# Patient Record
Sex: MALE | Race: BLACK OR AFRICAN AMERICAN | Employment: FULL TIME | URBAN - METROPOLITAN AREA
[De-identification: names, ages, dates, MRNs, and addresses within clinical notes are randomized per-mention and may not be internally consistent; named-entity substitution may affect disease eponyms.]

---

## 2017-04-21 DIAGNOSIS — K21.9 GASTROESOPHAGEAL REFLUX DISEASE, ESOPHAGITIS PRESENCE NOT SPECIFIED: Primary | ICD-10-CM

## 2017-04-21 RX ORDER — NICOTINE POLACRILEX 4 MG/1
20 GUM, CHEWING ORAL DAILY
Qty: 90 TABLET | Refills: 3 | Status: SHIPPED | OUTPATIENT
Start: 2017-04-21 | End: 2019-04-03

## 2017-04-21 NOTE — TELEPHONE ENCOUNTER
OMEPRAZOLE 20 MG CAP      Last Written Prescription Date: 01/10/12  Last Fill Quantity: 90,  # refills: 3   Last Office Visit with [facility], [facility] or [facility] prescribing provider: 10/06/16    Holly Padilla  Pharmacy Technician  [facility]

## 2017-05-18 ENCOUNTER — OFFICE VISIT (OUTPATIENT)
Dept: FAMILY MEDICINE | Facility: CLINIC | Age: 52
End: 2017-05-18
Payer: COMMERCIAL

## 2017-05-18 VITALS
TEMPERATURE: 97.6 F | SYSTOLIC BLOOD PRESSURE: 142 MMHG | HEIGHT: 68 IN | OXYGEN SATURATION: 96 % | WEIGHT: 174.6 LBS | DIASTOLIC BLOOD PRESSURE: 92 MMHG | HEART RATE: 84 BPM | BODY MASS INDEX: 26.46 KG/M2

## 2017-05-18 DIAGNOSIS — M50.30 DDD (DEGENERATIVE DISC DISEASE), CERVICAL: Primary | ICD-10-CM

## 2017-05-18 DIAGNOSIS — I10 ESSENTIAL HYPERTENSION WITH GOAL BLOOD PRESSURE LESS THAN 140/90: ICD-10-CM

## 2017-05-18 DIAGNOSIS — R20.2 NUMBNESS AND TINGLING OF RIGHT ARM: ICD-10-CM

## 2017-05-18 DIAGNOSIS — N52.9 ERECTILE DYSFUNCTION, UNSPECIFIED ERECTILE DYSFUNCTION TYPE: ICD-10-CM

## 2017-05-18 DIAGNOSIS — R21 RASH AND NONSPECIFIC SKIN ERUPTION: ICD-10-CM

## 2017-05-18 DIAGNOSIS — R20.0 NUMBNESS AND TINGLING OF RIGHT ARM: ICD-10-CM

## 2017-05-18 PROCEDURE — 99214 OFFICE O/P EST MOD 30 MIN: CPT | Performed by: FAMILY MEDICINE

## 2017-05-18 PROCEDURE — 80053 COMPREHEN METABOLIC PANEL: CPT | Performed by: FAMILY MEDICINE

## 2017-05-18 PROCEDURE — 36415 COLL VENOUS BLD VENIPUNCTURE: CPT | Performed by: FAMILY MEDICINE

## 2017-05-18 RX ORDER — AMLODIPINE BESYLATE 5 MG/1
10 TABLET ORAL DAILY
Qty: 90 TABLET | Refills: 3 | Status: SHIPPED | OUTPATIENT
Start: 2017-05-18 | End: 2019-04-03

## 2017-05-18 RX ORDER — LISINOPRIL 30 MG/1
30 TABLET ORAL DAILY
Qty: 90 TABLET | Refills: 3 | Status: SHIPPED | OUTPATIENT
Start: 2017-05-18 | End: 2017-08-21

## 2017-05-18 RX ORDER — SILDENAFIL 100 MG/1
50-100 TABLET, FILM COATED ORAL DAILY PRN
Qty: 6 TABLET | Refills: 1 | Status: SHIPPED | OUTPATIENT
Start: 2017-05-18 | End: 2017-06-07

## 2017-05-18 NOTE — LETTER
[address] [address] [phone]        May 22, 2017      Baron HUSSEIN Kenney  [address] [facility] [address]          Dear Homa Arellanom,    The results of your recent lab tests were within normal limits. Enclosed is a copy of these results.  If you have any further questions or problems, please contact our office.    Sincerely,        Cj Villanueva MD        Results for orders placed or performed in visit on 05/18/17   Comprehensive metabolic panel   Result Value Ref Range    Sodium 142 133 - 144 mmol/L    Potassium 4.6 3.4 - 5.3 mmol/L    Chloride 108 94 - 109 mmol/L    Carbon Dioxide 25 20 - 32 mmol/L    Anion Gap 9 3 - 14 mmol/L    Glucose 90 70 - 99 mg/dL    Urea Nitrogen 17 7 - 30 mg/dL    Creatinine 1.21 0.66 - 1.25 mg/dL    GFR Estimate 63 >60 mL/min/1.7m2    GFR Estimate If Black 77 >60 mL/min/1.7m2    Calcium 9.3 8.5 - 10.1 mg/dL    Bilirubin Total 0.2 0.2 - 1.3 mg/dL    Albumin 4.1 3.4 - 5.0 g/dL    Protein Total 7.7 6.8 - 8.8 g/dL    Alkaline Phosphatase 61 40 - 150 U/L    ALT 29 0 - 70 U/L    AST 17 0 - 45 U/L

## 2017-05-18 NOTE — PROGRESS NOTES
"  SUBJECTIVE:     CC: Baron HUSSEIN Kenney is an 51 year old male who presents for preventative health visit.     Healthy Habits:    Do you get at least three servings of calcium containing foods daily (dairy, green leafy vegetables, etc.)? {YES/NO, DAIRY INTAKE:173626::\"yes\"}    Amount of exercise or daily activities, outside of work: {AMOUNT EXERCISE:154332}    Problems taking medications regularly {Yes /No default:317216::\"No\"}    Medication side effects: {Yes /No default.:631811::\"No\"}    Have you had an eye exam in the past two years? {YESNOBLANK:137208}    Do you see a dentist twice per year? {YESNOBLANK:360427}    Do you have sleep apnea, excessive snoring or daytime drowsiness?{YESNOBLANK:563628}    {Outside tests to abstract? :282935}    {additional problems to add:240886}    Today's PHQ-2 Score:   PHQ-2 ( 1999 [employer]) 10/6/2016 6/16/2016   Q1: Little interest or pleasure in doing things 0 0   Q2: Feeling down, depressed or hopeless 0 0   PHQ-2 Score 0 0     {PHQ-2 LOOK IN ASSESSMENTS :484064}  Abuse: Current or Past(Physical, Sexual or Emotional)- {YES/NO/NA:996044}  Do you feel safe in your environment - {YES/NO/NA:289532}    Social History   Substance Use Topics     Smoking status: Never Smoker     Smokeless tobacco: Never Used     Alcohol use Yes      Comment: 1-2/wk     {ETOH AUDIT:408046}    Last PSA: No results found for: PSA    Recent Labs   Lab Test  10/06/16   0920  12/18/15   0845  01/10/13   0641   CHOL  212*  202*  248*   HDL  61  63  71   LDL  133*  115*  161*   TRIG  92  119  79   CHOLHDLRATIO   --    --   3.5   NHDL  151*  139*   --        Reviewed orders with patient. Reviewed health maintenance and updated orders accordingly - {Yes/No:700817::\"Yes\"}    Reviewed and updated as needed this visit by clinical staff         Reviewed and updated as needed this visit by Provider        {HISTORY OPTIONS:884741}    ROS:  {MALE ROS-adult preventive care package:900483::\"C: NEGATIVE for fever, chills, " "change in weight\",\"I: NEGATIVE for worrisome rashes, moles or lesions\",\"E: NEGATIVE for vision changes or irritation\",\"ENT: NEGATIVE for ear, mouth and throat problems\",\"R: NEGATIVE for significant cough or SOB\",\"CV: NEGATIVE for chest pain, palpitations or peripheral edema\",\"GI: NEGATIVE for nausea, abdominal pain, heartburn, or change in bowel habits\",\" male: negative for dysuria, hematuria, decreased urinary stream, erectile dysfunction, urethral discharge\",\"M: NEGATIVE for significant arthralgias or myalgia\",\"N: NEGATIVE for weakness, dizziness or paresthesias\",\"P: NEGATIVE for changes in mood or affect\"}    {CHRONICPROBDATA:892342}  OBJECTIVE:     There were no vitals taken for this visit.  EXAM:  {Exam Choices:658967}    ASSESSMENT/PLAN:     {Diag Picklist:888137}    COUNSELING:  {MALE COUNSELING MESSAGES:236162::\"Reviewed preventive health counseling, as reflected in patient instructions\"}    {Blood Pressure - Adult Preventive:169210}     reports that he has never smoked. He has never used smokeless tobacco.  {Tobacco Cessation needed for ACO -- Delete if patient is a non-smoker:656916}  Estimated body mass index is 25.95 kg/(m^2) as calculated from the following:    Height as of 10/6/16: 5' 8.11\" (1.73 m).    Weight as of 10/6/16: 171 lb 3.2 oz (77.7 kg).   {Weight Management Plan needed for ACO:767413}    Counseling Resources:  ATP IV Guidelines  Pooled Cohorts Equation Calculator  FRAX Risk Assessment  ICSI Preventive Guidelines  Dietary Guidelines for Americans, 2010  USDA's MyPlate  ASA Prophylaxis  Lung CA Screening    Cj Villanueva MD  [facility] [place]  "

## 2017-05-18 NOTE — PROGRESS NOTES
"  SUBJECTIVE:                                                    Baron HUSSEIN Kenney is a 51 year old male who presents to clinic today for the following health issues:      Joint Pain     Onset: off and on 2 years but 2 weeks it was worsen     Description:   Location: right shoulder  Character: Dull ache    Intensity: moderate    Progression of Symptoms: worse    Accompanying Signs & Symptoms:  Other symptoms: numbness, tingling and swelling   History:   Previous similar pain: no       Precipitating factors:   Trauma or overuse: no     Alleviating factors:  Improved by: nothing       Therapies Tried and outcome: none       Hypertension Follow-up      Outpatient blood pressures are not being checked.    Low Salt Diet: no added salt     Encounter Diagnoses   Name Primary?     Essential hypertension with goal blood pressure less than 140/90      DDD (degenerative disc disease), cervical Yes     Numbness and tingling of right arm      Erectile dysfunction, unspecified erectile dysfunction type, would like to try medication       Rash and nonspecific skin eruption right ankle not responding to anti fungals         Problem list and histories reviewed & adjusted, as indicated.  Additional history: as documented    Labs reviewed in EPIC    Reviewed and updated as needed this visit by clinical staff       Reviewed and updated as needed this visit by Provider         ROS:  Constitutional, HEENT, cardiovascular, pulmonary, gi and gu systems are negative, except as otherwise noted.    OBJECTIVE:                                                    BP (!) 142/92  Pulse 84  Temp 97.6  F (36.4  C) (Oral)  Ht 5' 8.11\" (1.73 m)  Wt 174 lb 9.6 oz (79.2 kg)  SpO2 96%  BMI 26.46 kg/m2  Body mass index is 26.46 kg/(m^2).  GENERAL: alert and fit  EYES: Eyes grossly normal to inspection, PERRL and conjunctivae and sclerae normal  HENT: ear canals and TM's normal, nose and mouth without ulcers or lesions  NECK: no adenopathy, no " asymmetry, masses, or scars and thyroid normal to palpation  RESP: lungs clear to auscultation - no rales, rhonchi or wheezes  CV: regular rate and rhythm, normal S1 S2, no S3 or S4, no murmur, click or rub, no peripheral edema and peripheral pulses strong  ABDOMEN: soft, nontender, no hepatosplenomegaly, no masses and bowel sounds normal  MS: normal muscle tone but strenght decreased in right arm vs left  and tenderness to palpation right cervical spne   SKIN: maculopapular eruption - right ankle  NEURO: weakness of right arm, sensory exam grossly normal, light touch and pinprick normal and mentation intact    Diagnostic Test Results:  Results for orders placed or performed in visit on 10/06/16   Comprehensive metabolic panel   Result Value Ref Range    Sodium 139 133 - 144 mmol/L    Potassium 3.6 3.4 - 5.3 mmol/L    Chloride 106 94 - 109 mmol/L    Carbon Dioxide 27 20 - 32 mmol/L    Anion Gap 6 3 - 14 mmol/L    Glucose 94 70 - 99 mg/dL    Urea Nitrogen 13 7 - 30 mg/dL    Creatinine 1.20 0.66 - 1.25 mg/dL    GFR Estimate 64 >60 mL/min/1.7m2    GFR Estimate If Black 78 >60 mL/min/1.7m2    Calcium 9.3 8.5 - 10.1 mg/dL    Bilirubin Total 0.5 0.2 - 1.3 mg/dL    Albumin 4.0 3.4 - 5.0 g/dL    Protein Total 7.5 6.8 - 8.8 g/dL    Alkaline Phosphatase 54 40 - 150 U/L    ALT 41 0 - 70 U/L    AST 17 0 - 45 U/L   Microalbumin quantitative random urine   Result Value Ref Range    Creatinine Urine 195 mg/dL    Albumin Urine mg/L 7 mg/L    Albumin Urine mg/g Cr 3.83 0 - 17 mg/g Cr   TSH with free T4 reflex   Result Value Ref Range    TSH 0.77 0.40 - 4.00 mU/L   Lipid Profile   Result Value Ref Range    Cholesterol 212 (H) <200 mg/dL    Triglycerides 92 <150 mg/dL    HDL Cholesterol 61 >39 mg/dL    LDL Cholesterol Calculated 133 (H) <100 mg/dL    Non HDL Cholesterol 151 (H) <130 mg/dL        ASSESSMENT/PLAN:                                                            1. Essential hypertension with goal blood pressure less than  140/90  Not Well controlled   increase  - amLODIPine (NORVASC) 5 MG tablet; Take 2 tablets (10 mg) by mouth daily  Dispense: 90 tablet; Refill: 3  - lisinopril (PRINIVIL,ZESTRIL) 30 MG tablet; Take 1 tablet (30 mg) by mouth daily  Dispense: 90 tablet; Refill: 3  - Comprehensive metabolic panel   Follow up in 2 months.   2. DDD (degenerative disc disease), cervical  Needs evaluation due to radicuopthy  - MR Cervical Spine w/o & w Contrast; Future    3. Numbness and tingling of right arm  As above  - MR Cervical Spine w/o & w Contrast; Future    4. Erectile dysfunction, unspecified erectile dysfunction type  Ok to try once BP controlled  - sildenafil (VIAGRA) 100 MG cap/tab; Take 0.5-1 tablets ( mg) by mouth daily as needed for erectile dysfunction Take 30 min to 4 hours before intercourse.  Never use with nitroglycerin, terazosin or doxazosin.  Dispense: 6 tablet; Refill: 1    5. Rash and nonspecific skin eruption  Did noot respond to antifungals/ steroid  - DERMATOLOGY REFERRAL    Regular exercise  See Patient Instructions    Cj Villanueva MD  [facility] [place]

## 2017-05-18 NOTE — MR AVS SNAPSHOT
After Visit Summary   5/18/2017    Baron HUSSEIN Kenney    MRN: [id number]           Patient Information     Date Of Birth          1965        Visit Information        Provider Department      5/18/2017 11:15 AM Cj Villanueva MD [facility] [place]        Today's Diagnoses     DDD (degenerative disc disease), cervical    -  1    Essential hypertension with goal blood pressure less than 140/90        Numbness and tingling of right arm        Erectile dysfunction, unspecified erectile dysfunction type          Care Instructions      Preventive Health Recommendations  Male Ages 50 - 64    Yearly exam:             See your health care provider every year in order to  o   Review health changes.   o   Discuss preventive care.    o   Review your medicines if your doctor has prescribed any.     Have a cholesterol test every 5 years, or more frequently if you are at risk for high cholesterol/heart disease.     Have a diabetes test (fasting glucose) every three years. If you are at risk for diabetes, you should have this test more often.     Have a colonoscopy at age 50, or have a yearly FIT test (stool test). These exams will check for colon cancer.      Talk with your health care provider about whether or not a prostate cancer screening test (PSA) is right for you.    You should be tested each year for STDs (sexually transmitted diseases), if you re at risk.     Shots: Get a flu shot each year. Get a tetanus shot every 10 years.     Nutrition:    Eat at least 5 servings of fruits and vegetables daily.     Eat whole-grain bread, whole-wheat pasta and brown rice instead of white grains and rice.     Talk to your provider about Calcium and Vitamin D.     Lifestyle    Exercise for at least 150 minutes a week (30 minutes a day, 5 days a week). This will help you control your weight and prevent disease.     Limit alcohol to one drink per day.     No smoking.     Wear sunscreen to prevent skin  "cancer.     See your dentist every six months for an exam and cleaning.     See your eye doctor every 1 to 2 years.          Follow-ups after your visit        Future tests that were ordered for you today     Open Future Orders        Priority Expected Expires Ordered    MR Cervical Spine w/o & w Contrast Routine  2018            Who to contact     If you have questions or need follow up information about today's clinic visit or your schedule please contact [facility] directly at [phone].  Normal or non-critical lab and imaging results will be communicated to you by [employer], letter or phone within 4 business days after the clinic has received the results. If you do not hear from us within 7 days, please contact the clinic through [employer] or phone. If you have a critical or abnormal lab result, we will notify you by phone as soon as possible.  Submit refill requests through [employer] or call your pharmacy and they will forward the refill request to us. Please allow 3 business days for your refill to be completed.          Additional Information About Your Visit        [employer] Information     [employer] lets you send messages to your doctor, view your test results, renew your prescriptions, schedule appointments and more. To sign up, go to [email or web address] . Click on \"Log in\" on the left side of the screen, which will take you to the Welcome page. Then click on \"Sign up Now\" on the right side of the page.     You will be asked to enter the access code listed below, as well as some personal information. Please follow the directions to create your username and password.     Your access code is: [email or web address]  Expires: 2017  9:30 AM     Your access code will  in 90 days. If you need help or a new code, please call your [facility] clinic or [phone].        Care EveryWhere ID     This is your Care EveryWhere ID. This could be used by other organizations to access your " "[facility] medical records  [id number]        Your Vitals Were     Pulse Temperature Height Pulse Oximetry BMI (Body Mass Index)       84 97.6  F (36.4  C) (Oral) 5' 8.11\" (1.73 m) 96% 26.46 kg/m2        Blood Pressure from Last 3 Encounters:   05/18/17 (!) 142/92   10/06/16 137/85   08/17/16 126/72    Weight from Last 3 Encounters:   05/18/17 174 lb 9.6 oz (79.2 kg)   10/06/16 171 lb 3.2 oz (77.7 kg)   08/17/16 175 lb (79.4 kg)              We Performed the Following     Comprehensive metabolic panel          Today's Medication Changes          These changes are accurate as of: 5/18/17 11:47 AM.  If you have any questions, ask your nurse or doctor.               Start taking these medicines.        Dose/Directions    sildenafil 100 MG cap/tab   Commonly known as:  VIAGRA   Used for:  Erectile dysfunction, unspecified erectile dysfunction type   Started by:  Cj Villanueva MD        Dose:   mg   Take 0.5-1 tablets ( mg) by mouth daily as needed for erectile dysfunction Take 30 min to 4 hours before intercourse.  Never use with nitroglycerin, terazosin or doxazosin.   Quantity:  6 tablet   Refills:  1         These medicines have changed or have updated prescriptions.        Dose/Directions    amLODIPine 5 MG tablet   Commonly known as:  NORVASC   This may have changed:  how much to take   Used for:  Essential hypertension with goal blood pressure less than 140/90   Changed by:  Cj Villanueva MD        Dose:  10 mg   Take 2 tablets (10 mg) by mouth daily   Quantity:  90 tablet   Refills:  3            Where to get your medicines      These medications were sent to [facility] [place], [place] - [address]  [address] [address]     Phone:  [phone]     amLODIPine 5 MG tablet    lisinopril 30 MG tablet         Some of these will need a paper prescription and others can be bought over the counter.  Ask your nurse if you have questions.  "    Bring a paper prescription for each of these medications     sildenafil 100 MG cap/tab                Primary Care Provider Office Phone # Fax #    Cj Luís Villanueva [phone]       [facility] [address] [facility] [address]  [facility] [address]        Thank you!     Thank you for choosing [facility]  for your care. Our goal is always to provide you with excellent care. Hearing back from our patients is one way we can continue to improve our services. Please take a few minutes to complete the written survey that you may receive in the mail after your visit with us. Thank you!             Your Updated Medication List - Protect others around you: Learn how to safely use, store and throw away your medicines at www.disposemymeds.org.          This list is accurate as of: 5/18/17 11:47 AM.  Always use your most recent med list.                   Brand Name Dispense Instructions for use    amLODIPine 5 MG tablet    NORVASC    90 tablet    Take 2 tablets (10 mg) by mouth daily       aspirin 81 MG tablet     90 tablet    Take 1 tablet (81 mg) by mouth daily       lisinopril 30 MG tablet    PRINIVIL,ZESTRIL    90 tablet    Take 1 tablet (30 mg) by mouth daily       omeprazole 20 MG tablet     90 tablet    Take 1 tablet (20 mg) by mouth daily       sildenafil 100 MG cap/tab    VIAGRA    6 tablet    Take 0.5-1 tablets ( mg) by mouth daily as needed for erectile dysfunction Take 30 min to 4 hours before intercourse.  Never use with nitroglycerin, terazosin or doxazosin.       triamcinolone 0.1 % ointment    KENALOG    15 g    Apply sparingly to affected area three times daily for 14 days.

## 2017-05-18 NOTE — PROGRESS NOTES
"Chief Complaint   Patient presents with     Musculoskeletal Problem       Initial BP (!) 148/104  Pulse 84  Temp 97.6  F (36.4  C) (Oral)  Ht 5' 8.11\" (1.73 m)  Wt 174 lb 9.6 oz (79.2 kg)  SpO2 96%  BMI 26.46 kg/m2 Estimated body mass index is 26.46 kg/(m^2) as calculated from the following:    Height as of this encounter: 5' 8.11\" (1.73 m).    Weight as of this encounter: 174 lb 9.6 oz (79.2 kg).  Medication Reconciliation: complete     Kat Figueroa MA      "

## 2017-05-19 LAB
ALBUMIN SERPL-MCNC: 4.1 G/DL (ref 3.4–5)
ALP SERPL-CCNC: 61 U/L (ref 40–150)
ALT SERPL W P-5'-P-CCNC: 29 U/L (ref 0–70)
ANION GAP SERPL CALCULATED.3IONS-SCNC: 9 MMOL/L (ref 3–14)
AST SERPL W P-5'-P-CCNC: 17 U/L (ref 0–45)
BILIRUB SERPL-MCNC: 0.2 MG/DL (ref 0.2–1.3)
BUN SERPL-MCNC: 17 MG/DL (ref 7–30)
CALCIUM SERPL-MCNC: 9.3 MG/DL (ref 8.5–10.1)
CHLORIDE SERPL-SCNC: 108 MMOL/L (ref 94–109)
CO2 SERPL-SCNC: 25 MMOL/L (ref 20–32)
CREAT SERPL-MCNC: 1.21 MG/DL (ref 0.66–1.25)
GFR SERPL CREATININE-BSD FRML MDRD: 63 ML/MIN/1.7M2
GLUCOSE SERPL-MCNC: 90 MG/DL (ref 70–99)
POTASSIUM SERPL-SCNC: 4.6 MMOL/L (ref 3.4–5.3)
PROT SERPL-MCNC: 7.7 G/DL (ref 6.8–8.8)
SODIUM SERPL-SCNC: 142 MMOL/L (ref 133–144)

## 2017-05-19 NOTE — PROGRESS NOTES
"Please notify patient of normal lab results.  Thank you.    Please add below note.  \"The results of your recent lab tests were within normal limits. Enclosed is a copy of these results.  If you have any further questions or problems, please contact our office at [phone].\""

## 2017-05-24 ENCOUNTER — TELEPHONE (OUTPATIENT)
Dept: FAMILY MEDICINE | Facility: CLINIC | Age: 52
End: 2017-05-24

## 2017-05-24 ENCOUNTER — HOSPITAL ENCOUNTER (OUTPATIENT)
Dept: MRI IMAGING | Facility: CLINIC | Age: 52
Discharge: HOME | End: 2017-05-24
Attending: FAMILY MEDICINE | Admitting: FAMILY MEDICINE
Payer: COMMERCIAL

## 2017-05-24 DIAGNOSIS — R20.0 NUMBNESS AND TINGLING OF RIGHT ARM: ICD-10-CM

## 2017-05-24 DIAGNOSIS — M50.30 DDD (DEGENERATIVE DISC DISEASE), CERVICAL: ICD-10-CM

## 2017-05-24 DIAGNOSIS — R20.2 NUMBNESS AND TINGLING OF RIGHT ARM: ICD-10-CM

## 2017-05-24 DIAGNOSIS — M48.02 SPINAL STENOSIS IN CERVICAL REGION: Primary | ICD-10-CM

## 2017-05-24 PROCEDURE — 72141 MRI NECK SPINE W/O DYE: CPT

## 2017-05-24 NOTE — TELEPHONE ENCOUNTER
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       No referral for spine surgeon was placed and there are a few options. Please review and sign referral, then will call pt back with referral information. He was already given results. Thanks.    Bella Chacon RN  [facility] [place]

## 2017-05-25 NOTE — TELEPHONE ENCOUNTER
Thanks     Orders Placed This Encounter     SPINE SURGERY REFERRAL     Referral Type:   Consultation

## 2017-05-25 NOTE — TELEPHONE ENCOUNTER
Called pt with referral information.    Bella Chacon RN  [facility] [place]

## 2017-06-07 ENCOUNTER — OFFICE VISIT (OUTPATIENT)
Dept: NEUROSURGERY | Facility: CLINIC | Age: 52
End: 2017-06-07

## 2017-06-07 VITALS
BODY MASS INDEX: 26.59 KG/M2 | HEIGHT: 67 IN | DIASTOLIC BLOOD PRESSURE: 99 MMHG | TEMPERATURE: 97.7 F | RESPIRATION RATE: 20 BRPM | WEIGHT: 169.4 LBS | HEART RATE: 78 BPM | SYSTOLIC BLOOD PRESSURE: 143 MMHG | OXYGEN SATURATION: 98 %

## 2017-06-07 DIAGNOSIS — M48.02 SPINAL STENOSIS IN CERVICAL REGION: Primary | ICD-10-CM

## 2017-06-07 DIAGNOSIS — M47.22 CERVICAL RADICULOPATHY DUE TO OSTEOARTHRITIS OF SPINE: ICD-10-CM

## 2017-06-07 RX ORDER — METHYLPREDNISOLONE 4 MG
TABLET, DOSE PACK ORAL
Qty: 21 TABLET | Refills: 0 | Status: SHIPPED | OUTPATIENT
Start: 2017-06-07 | End: 2018-01-29

## 2017-06-07 ASSESSMENT — PAIN SCALES - GENERAL: PAINLEVEL: MILD PAIN (3)

## 2017-06-07 ASSESSMENT — ENCOUNTER SYMPTOMS
PARALYSIS: 0
TINGLING: 1
NAIL CHANGES: 0
BACK PAIN: 1
MEMORY LOSS: 0
NECK PAIN: 1
JOINT SWELLING: 0
STIFFNESS: 0
SPEECH CHANGE: 0
POOR WOUND HEALING: 0
HEADACHES: 0
MYALGIAS: 0
MUSCLE CRAMPS: 0
LOSS OF CONSCIOUSNESS: 0
DISTURBANCES IN COORDINATION: 0
ARTHRALGIAS: 1
TREMORS: 0
DIZZINESS: 0
MUSCLE WEAKNESS: 1
WEAKNESS: 0
SKIN CHANGES: 0
SEIZURES: 0

## 2017-06-07 NOTE — PATIENT INSTRUCTIONS
1.  Call [facility] at [phone] to schedule your consultation and for instructions with Cervical traction  2.  If you have questions or concerns, please call [facility] at [phone]  3.  Take steroids as directed

## 2017-06-07 NOTE — NURSING NOTE
Chief Complaint   Patient presents with     Consult     [facility]- RIGHT SIDED NECK AND SHOULDER PAIN.     ELIAZAR Bourne.A

## 2017-06-07 NOTE — MR AVS SNAPSHOT
After Visit Summary   6/7/2017    Baron HUSSEIN Kenney    MRN: [id number]           Patient Information     Date Of Birth          1965        Visit Information        Provider Department      6/7/2017 12:30 PM Emely Shah PA-C [facility] Neurosurgery        Today's Diagnoses     Spinal stenosis in cervical region    -  1      Care Instructions    1.  Call [facility] at [phone] to schedule your consultation and for instructions with Cervical traction  2.  If you have questions or concerns, please call Adela at [phone]  3.  Take steroids as directed           Follow-ups after your visit        Additional Services     PT Evaluation and Treatment (Internal Referral) [2990]       Your provider has referred you to: [facility], phone [phone].    Please be aware that coverage of these services is subject to the terms and limitations of your health insurance plan.  Call member services at your health plan with any benefit or coverage questions.      Treatment: Evaluation & Treatment  Special Instructions: None  Special Programs: None  Modalities: As indicated  Equipment: None  Duration and Frequency: Per Therapist Evaluation    Please bring the following to your appointment:  >>   Any x-rays, CTs or MRIs which have been performed.  Contact the facility where they were done to arrange for  prior to your scheduled appointment.  Any new CT, MRI or other procedures ordered by your specialist must be performed at a [facility] [place] facility or coordinated by your clinic's referral office.    >>   List of current medications   >>   This referral request   >>   Any documents/labs given to you for this referral      **Note to Provider** To refer patients to therapy outside of the Location list, change the order class to External Referral in the General section.                  Follow-up notes from your care team     Return in about 6 weeks (around 7/19/2017).       Your next 10 appointments already scheduled     2017  8:00 AM CDT   (Arrive by 7:45 AM)   New Patient Visit with MD JAI aCrey [facility] ([facility])    [address] [place] [id number] [phone]            2017  4:00 PM CDT   (Arrive by 3:45 PM)   Return Visit with SAMIR Navarrete [facility] ([facility])    [address] [place] [id number] [phone]              Who to contact     Please call your clinic at [phone] to:    Ask questions about your health    Make or cancel appointments    Discuss your medicines    Learn about your test results    Speak to your doctor   If you have compliments or concerns about an experience at your clinic, or if you wish to file a complaint, please contact [facility] Patient Relations at [phone] or email us at [email or web address]         Additional Information About Your Visit        [employer] Information     [employer] is an electronic gateway that provides easy, online access to your medical records. With [employer], you can request a clinic appointment, read your test results, renew a prescription or communicate with your care team.     To sign up for [employer] visit the website at [email or web address]   You will be asked to enter the access code listed below, as well as some personal information. Please follow the directions to create your username and password.     Your access code is: [email or web address]  Expires: 2017  1:40 PM     Your access code will  in 90 days. If you need help or a new code, please contact your [facility] or call [phone] for assistance.        Care EveryWhere ID     This is your Care EveryWhere ID. This could be used by other organizations to access your [facility] medical records  [id number]        Your  "Vitals Were     Pulse Temperature Respirations Height Pulse Oximetry BMI (Body Mass Index)    78 97.7  F (36.5  C) (Oral) 20 1.702 m (5' 7\") 98% 26.53 kg/m2       Blood Pressure from Last 3 Encounters:   06/07/17 (!) 143/99   05/18/17 (!) 142/92   10/06/16 137/85    Weight from Last 3 Encounters:   06/07/17 76.8 kg (169 lb 6.4 oz)   05/18/17 79.2 kg (174 lb 9.6 oz)   10/06/16 77.7 kg (171 lb 3.2 oz)              We Performed the Following     PT Evaluation and Treatment (Internal Referral) [9032]          Today's Medication Changes          These changes are accurate as of: 6/7/17  1:40 PM.  If you have any questions, ask your nurse or doctor.               Start taking these medicines.        Dose/Directions    Cervical Traction Kit   Used for:  Spinal stenosis in cervical region   Started by:  Emely Shah PA-C        Use as directed   Quantity:  1 kit   Refills:  0       methylPREDNISolone 4 MG tablet   Commonly known as:  MEDROL DOSEPAK   Used for:  Spinal stenosis in cervical region   Started by:  Emely Shah PA-C        Follow package instructions   Quantity:  21 tablet   Refills:  0            Where to get your medicines      Some of these will need a paper prescription and others can be bought over the counter.  Ask your nurse if you have questions.     Bring a paper prescription for each of these medications     Cervical Traction Kit    methylPREDNISolone 4 MG tablet                Primary Care Provider Office Phone # Fax #    Cj Villanueva [phone]       [facility] [address] [place] [address] [address]  [facility] [address]        Thank you!     Thank you for choosing [facility]  for your care. Our goal is always to provide you with excellent care. Hearing back from our patients is one way we can continue to improve our services. Please take a few minutes to complete the written survey that you may receive in the mail after your visit with us. Thank you!      "        Your Updated Medication List - Protect others around you: Learn how to safely use, store and throw away your medicines at www.disposemymeds.org.          This list is accurate as of: 6/7/17  1:40 PM.  Always use your most recent med list.                   Brand Name Dispense Instructions for use    amLODIPine 5 MG tablet    NORVASC    90 tablet    Take 2 tablets (10 mg) by mouth daily       aspirin 81 MG tablet     90 tablet    Take 1 tablet (81 mg) by mouth daily       Cervical Traction Kit     1 kit    Use as directed       lisinopril 30 MG tablet    PRINIVIL,ZESTRIL    90 tablet    Take 1 tablet (30 mg) by mouth daily       methylPREDNISolone 4 MG tablet    MEDROL DOSEPAK    21 tablet    Follow package instructions       omeprazole 20 MG tablet     90 tablet    Take 1 tablet (20 mg) by mouth daily

## 2017-06-07 NOTE — PROGRESS NOTES
Neurosurgery Clinic Consult  Date of Visit: 6/7/2017  Referred for: Right arm pain.    Referring Provider:  Cj Villanueva MD      Dear Dr. Villanueva:  We were happy to see Baron Kenney in our Neurosurgery Clinic today for right arm pain.      As you may recall, his history begins [id number] years ago with some right arm soreness.  At that time, he thought it was from being very physically active, he is quite fit and continues multiple sports.  He didn't do much about it and it has stayed pretty stable over the years.  Over the last few weeks it is worsened precipitously such that he now has pain in the back of the right neck, down the right trapezius through the shoulder and the triceps all of the way down to about the elbow level.  From there, he starts having some tingling starting in the triceps which goes to the middle fingers, occasionally all of the fingers, but really mostly into the middle fingers of the right hand.  It is sometimes tingles and wakes him from sleep.  Trying to shake it out does not make much difference though.  Pain sensation gets worse with driving or using the arm for anything.  He feels as though his arm is weak and he cannot shoot a basketball as far as he used to anymore.  His weakness he notes in the deltoid, bicep and triceps - all 3 of the proximal arm muscles.  He does not feel weakness distally.  Bowel and bladder function are intact.  Other symptoms such as coordination, balance are all intact.      I reviewed his UC pain questionnaire today with him, and those results are below.  UC Pain -  Patient Entered Questionnaire/Answers 6/7/2017   What number best describes your pain right now:  0 = No pain  to  10 = Worst pain imaginable 4   How would you describe the pain? numbness   Which of the following worsen your pain? lying down, standing, sitting, walking, relaxation   Which of the following improve or reduce your pain?  nothing relieves the pain   What number best  describes your average pain for the past week:  0 = No pain  to  10 = Worst pain imaginable 6   What number best describes your LOWEST pain in past 24 hours:  0 = No pain  to  10 = Worst pain imaginable 1   What number best describes your WORST pain in past 24 hours:  0 = No pain  to  10 = Worst pain imaginable 6   When is your pain worst? Constant   What non-medicine treatments have you already had for your pain? none   Have you tried treating your pain with medication?  No   Are you currently taking medications for your pain? No      Treatment History:  Essentially none thus far.  He has had an MRI performed and based on that, he was sent for a neurosurgical evaluation.  He does recall that may be at 5-6 years ago he was given a Dosepak for some neck or back pain, but he has not had anything performed recently, no physical therapy.  He is a very physical man, does boxing, martial arts, plays basketball and he is still able to do all those things but with much less effectiveness in the function of his right arm.    He presents for evaluation, and treatment recommendations with the understanding that we are a surgical team.      Current Outpatient Prescriptions:      methylPREDNISolone (MEDROL DOSEPAK) 4 MG tablet, Follow package instructions, Disp: 21 tablet, Rfl: 0     Misc. Devices (CERVICAL TRACTION) KIT, Use as directed, Disp: 1 kit, Rfl: 0     amLODIPine (NORVASC) 5 MG tablet, Take 2 tablets (10 mg) by mouth daily, Disp: 90 tablet, Rfl: 3     lisinopril (PRINIVIL,ZESTRIL) 30 MG tablet, Take 1 tablet (30 mg) by mouth daily, Disp: 90 tablet, Rfl: 3     omeprazole 20 MG tablet, Take 1 tablet (20 mg) by mouth daily, Disp: 90 tablet, Rfl: 3     aspirin 81 MG tablet, Take 1 tablet (81 mg) by mouth daily, Disp: 90 tablet, Rfl: 3    No Known Allergies    Past Medical History:   Diagnosis Date     Gastro-oesophageal reflux disease      Hypertension        History reviewed. No pertinent surgical history.    Family  "History   Problem Relation Age of Onset     HEART DISEASE Other      4 cousins with stents at an early age     DIABETES Maternal Grandfather      DIABETES Paternal Grandfather      Hypertension Father        Social History     Social History     Marital status:      Spouse name: N/A     Number of children: N/A     Years of education: N/A     Occupational History     Not on file.     Social History Main Topics     Smoking status: Never Smoker     Smokeless tobacco: Never Used     Alcohol use 0.6 - 1.2 oz/week     1 - 2 Cans of beer per week      Comment: PER YEAR     Drug use: No     Sexual activity: Yes     Partners: Female     Other Topics Concern     Parent/Sibling W/ Cabg, Mi Or Angioplasty Before 65f 55m? No     Social History Narrative   Problem list and 13 point review of systems: is reviewed in Epic and is negative with the exception of those symptoms associated with HPI and PMH.      OBJECTIVE:   BP (!) 143/99 (BP Location: Right arm, Patient Position: Chair, Cuff Size: Adult Large)  Pulse 78  Temp 97.7  F (36.5  C) (Oral)  Resp 20  Ht 1.702 m (5' 7\")  Wt 76.8 kg (169 lb 6.4 oz)  SpO2 98%  BMI 26.53 kg/m2    Imaging:  These are the pertinent radiologist's findings from:  MRI Cspine WO 5/24/17 @ [facility]   C2-C3: Normal disc, facet joints, spinal canal and neural foramina.     C3-C4: Mild annular disc bulge. Central canal patent. Moderate left  foraminal stenosis due to an uncinate spur.     C4-C5: Degeneration of the disc. Mild loss of disc space height. Mild  diffuse annular disc bulge. Associated posterior osteophyte. Central  canal mild-moderately narrowed due to the bulging disc.  Moderate-severe bilateral foraminal stenosis due to loss of disc space  height and uncinate spurs.     C5-C6: Degeneration of the disc. Loss of disc space height. Diffuse  annular disc bulge with associated posterior osteophytes. Central  canal mildly narrowed. Severe right and moderate-severe left foraminal  stenosis " due to loss of disc space height and uncinate spurs.     C6-C7: Degeneration of the disc. Loss of disc space height. Diffuse  annular disc bulge with associated posterior osteophytes leading to  mild central stenosis. There is severe right and left foraminal  stenosis due to loss of disc space height and uncinate spurs.     C7-T1: Degeneration of the disc. Loss of disc space height. Mild  annular disc bulge. Central canal and neural foramen appear patent.         IMPRESSION:    1. Multilevel degenerative change.  2. There is severe bilateral foraminal stenosis at C4-C5, C5-C6 and  C6-C7 due to loss of disc space height and uncinate spurs.  3. No focal right-sided disc herniations are seen   See the full report in EPIC/Media tab.  I personally reviewed the images with the patient.      Exam:  Pertinent positives:  He has bilateral triceps weakness, no abnormal reflexes, no loss of sensation to light touch.  Given that he has an extremely powerful upper torso, I am unable to detect any other weakness.   *   Well developed, well nourished male found seated comfortably in exam room chair.  He is able to sit and rise independently.  He is unaccompanied.    *  Mental Status  A&O X3.  Bright, alert, affable, interactive. Language fluid, fund of knowledge intact. Good historian.  Mood and affect congruent and WNL.   *  Cranial Nerves  II: Able to read printed forms, VF full to gross confrontation.  III: IV, VI:  PERRLA, EOMI, No nystagmus, no ptosis.  V: Sensation intact in bilateral V1, V2, and V3. Jaw clench symmetric.    VII:  Intact to voice bilaterally.  IX:  Pushes tongue against bilateral cheeks.  X:  Palate elevates, uvula midline, phonation intact.  XI: Elevates shoulders, head turn intact.  XII: Tongue midline. No fasciculations.  *  Cerebellar:  Finger nose accurate. Heel shin intact.   Rapid alternating movements smooth.  *  Brien-cranial:    No drift.  *  Cervical Spine:  DTR's at Biceps, Triceps, and  Brachioradialis 2/4 and symmetric.  Sensation intact.    No Knowles's. No Phalen's.  No Tinel's. No fasciculations.   Muscle bulk and tone WNL.  Upper Extremity Strength.              RIGHT                LEFT     Deltoid              5/5                   5/5       Biceps              5/5                   5/5        Triceps              5/5                   5/5       Wrist Extensor              4/5                   4/5                     5/5                    5/5       Interossei              5/5                   5/5       EPL              5/5                    5/5       Pinch              5/5                  5/5          *  Lumbar Spine:    DTR's Patellar and Achilles 1/4 and symmetric.   No fasciculations.  Muscle bulk and tone WNL.  No Clonus.  Sensation intact.    Lower Extremity Strength                   RIGHT                   LEFT     Iliopsoas                    5/5                      5/5       Quad                    5/5                        5/5       Hamstring                      5/5                        5/5         Gastrocs                    5/5                        5/5       Tib. Anterior                     5/5                        5/5       EHL                      5/5                        5/5       Babinski                 Down                                      Down                   *  Structural Exam  Inspection of the spine reveals no scoliosis, exaggerated kyphosis or lordosis.  Head is balanced over the pelvis in the coronal and sagittal plane.    Cervical spine ROM full. No spasming. No tenderness to palpation.  No Spurling's or L'Hermitte's.   Lumbar ROM full.  Feet are warm.  Gait narrow, smooth, no scissoring.  Negative Romberg.     ASSESSMENT:  1. Spinal stenosis in cervical region    2. Cervical radiculopathy due to osteoarthritis of spine    3.      Mr. Kenney has multiple levels of cervical spondylosis, most remarkably left C3-4, bilateral C4-5, right  greater than left C5-6, bilateral C6-7.  He has central stenosis at multiple levels but not severe.  He has most significantly pain and tingling into the fingers in the C7 distribution.  I believe that is probably has primarily symptomatic level, although others could be contributing as well.  At this point, we are going to try to manage this nonoperatively, even given the triceps weakness noted.  We will start him Dosepak, physical therapy and home traction unit and will follow up in about 6 weeks.  If he has made no improvement and his triceps are still weak, then we will get him off to see the neurosurgeon for a surgical discussion.  He is happy with this plan.  He does not want to have surgery if he can possibly avoid it, and I agree.      I have answered all of his questions.  The questions indicated a good understanding of the discussion.  We have supplied him with business cards and telephone numbers and urged him to call should he have any questions, comments or concerns.      It has been our pleasure looking after this nice patient.  We appreciate your confidence in [facility] Department of Neurosurgery.       Best Regards,    Emely Shah PA-C  [facility]  Department of Neurosurgery  Phone: [phone]  Fax: [phone]        Total time: 60 minutes with more than 30 minutes spent in direct face to face contact reviewing films, providing education, counseling, the importance of good health habits, non-operative therapies,and indications for surgery as well as further follow up.

## 2017-06-07 NOTE — LETTER
6/7/2017       RE: Baron HUSSEIN Kenney  [address]  [facility] [address]     Dear Colleague,    Thank you for referring your patient, Baron HUSSEIN Kenney, to the [facility] NEUROSURGERY at [facility]. Please see a copy of my visit note below.      Neurosurgery Clinic Consult  Date of Visit: 6/7/2017  Referred for: Right arm pain.    Referring Provider:  Cj Villanueva MD      Dear Dr. Villanueva:  We were happy to see Baron Kenney in our Neurosurgery Clinic today for right arm pain.      As you may recall, his history begins [id number] years ago with some right arm soreness.  At that time, he thought it was from being very physically active, he is quite fit and continues multiple sports.  He didn't do much about it and it has stayed pretty stable over the years.  Over the last few weeks it is worsened precipitously such that he now has pain in the back of the right neck, down the right trapezius through the shoulder and the triceps all of the way down to about the elbow level.  From there, he starts having some tingling starting in the triceps which goes to the middle fingers, occasionally all of the fingers, but really mostly into the middle fingers of the right hand.  It is sometimes tingles and wakes him from sleep.  Trying to shake it out does not make much difference though.  Pain sensation gets worse with driving or using the arm for anything.  He feels as though his arm is weak and he cannot shoot a basketball as far as he used to anymore.  His weakness he notes in the deltoid, bicep and triceps - all 3 of the proximal arm muscles.  He does not feel weakness distally.  Bowel and bladder function are intact.  Other symptoms such as coordination, balance are all intact.      I reviewed his UC pain questionnaire today with him, and those results are below.  UC Pain -  Patient Entered Questionnaire/Answers 6/7/2017   What number best describes your pain right now:  0  = No pain  to  10 = Worst pain imaginable 4   How would you describe the pain? numbness   Which of the following worsen your pain? lying down, standing, sitting, walking, relaxation   Which of the following improve or reduce your pain?  nothing relieves the pain   What number best describes your average pain for the past week:  0 = No pain  to  10 = Worst pain imaginable 6   What number best describes your LOWEST pain in past 24 hours:  0 = No pain  to  10 = Worst pain imaginable 1   What number best describes your WORST pain in past 24 hours:  0 = No pain  to  10 = Worst pain imaginable 6   When is your pain worst? Constant   What non-medicine treatments have you already had for your pain? none   Have you tried treating your pain with medication?  No   Are you currently taking medications for your pain? No      Treatment History:  Essentially none thus far.  He has had an MRI performed and based on that, he was sent for a neurosurgical evaluation.  He does recall that may be at 5-6 years ago he was given a Dosepak for some neck or back pain, but he has not had anything performed recently, no physical therapy.  He is a very physical man, does boxing, martial arts, plays basketball and he is still able to do all those things but with much less effectiveness in the function of his right arm.    He presents for evaluation, and treatment recommendations with the understanding that we are a surgical team.      Current Outpatient Prescriptions:      methylPREDNISolone (MEDROL DOSEPAK) 4 MG tablet, Follow package instructions, Disp: 21 tablet, Rfl: 0     Misc. Devices (CERVICAL TRACTION) KIT, Use as directed, Disp: 1 kit, Rfl: 0     amLODIPine (NORVASC) 5 MG tablet, Take 2 tablets (10 mg) by mouth daily, Disp: 90 tablet, Rfl: 3     lisinopril (PRINIVIL,ZESTRIL) 30 MG tablet, Take 1 tablet (30 mg) by mouth daily, Disp: 90 tablet, Rfl: 3     omeprazole 20 MG tablet, Take 1 tablet (20 mg) by mouth daily, Disp: 90 tablet, Rfl:  "3     aspirin 81 MG tablet, Take 1 tablet (81 mg) by mouth daily, Disp: 90 tablet, Rfl: 3    No Known Allergies    Past Medical History:   Diagnosis Date     Gastro-oesophageal reflux disease      Hypertension        History reviewed. No pertinent surgical history.    Family History   Problem Relation Age of Onset     HEART DISEASE Other      4 cousins with stents at an early age     DIABETES Maternal Grandfather      DIABETES Paternal Grandfather      Hypertension Father        Social History     Social History     Marital status:      Spouse name: N/A     Number of children: N/A     Years of education: N/A     Occupational History     Not on file.     Social History Main Topics     Smoking status: Never Smoker     Smokeless tobacco: Never Used     Alcohol use 0.6 - 1.2 oz/week     1 - 2 Cans of beer per week      Comment: PER YEAR     Drug use: No     Sexual activity: Yes     Partners: Female     Other Topics Concern     Parent/Sibling W/ Cabg, Mi Or Angioplasty Before 65f 55m? No     Social History Narrative   Problem list and 13 point review of systems: is reviewed in Epic and is negative with the exception of those symptoms associated with HPI and PMH.      OBJECTIVE:   BP (!) 143/99 (BP Location: Right arm, Patient Position: Chair, Cuff Size: Adult Large)  Pulse 78  Temp 97.7  F (36.5  C) (Oral)  Resp 20  Ht 1.702 m (5' 7\")  Wt 76.8 kg (169 lb 6.4 oz)  SpO2 98%  BMI 26.53 kg/m2    Imaging:  These are the pertinent radiologist's findings from:  MRI Marlon WO 5/24/17 @ [facility]   C2-C3: Normal disc, facet joints, spinal canal and neural foramina.     C3-C4: Mild annular disc bulge. Central canal patent. Moderate left  foraminal stenosis due to an uncinate spur.     C4-C5: Degeneration of the disc. Mild loss of disc space height. Mild  diffuse annular disc bulge. Associated posterior osteophyte. Central  canal mild-moderately narrowed due to the bulging disc.  Moderate-severe bilateral foraminal " stenosis due to loss of disc space  height and uncinate spurs.     C5-C6: Degeneration of the disc. Loss of disc space height. Diffuse  annular disc bulge with associated posterior osteophytes. Central  canal mildly narrowed. Severe right and moderate-severe left foraminal  stenosis due to loss of disc space height and uncinate spurs.     C6-C7: Degeneration of the disc. Loss of disc space height. Diffuse  annular disc bulge with associated posterior osteophytes leading to  mild central stenosis. There is severe right and left foraminal  stenosis due to loss of disc space height and uncinate spurs.     C7-T1: Degeneration of the disc. Loss of disc space height. Mild  annular disc bulge. Central canal and neural foramen appear patent.         IMPRESSION:    1. Multilevel degenerative change.  2. There is severe bilateral foraminal stenosis at C4-C5, C5-C6 and  C6-C7 due to loss of disc space height and uncinate spurs.  3. No focal right-sided disc herniations are seen   See the full report in EPIC/Media tab.  I personally reviewed the images with the patient.      Exam:  Pertinent positives:  He has bilateral triceps weakness, no abnormal reflexes, no loss of sensation to light touch.  Given that he has an extremely powerful upper torso, I am unable to detect any other weakness.   *   Well developed, well nourished male found seated comfortably in exam room chair.  He is able to sit and rise independently.  He is unaccompanied.    *  Mental Status  A&O X3.  Bright, alert, affable, interactive. Language fluid, fund of knowledge intact. Good historian.  Mood and affect congruent and WNL.   *  Cranial Nerves  II: Able to read printed forms, VF full to gross confrontation.  III: IV, VI:  PERRLA, EOMI, No nystagmus, no ptosis.  V: Sensation intact in bilateral V1, V2, and V3. Jaw clench symmetric.    VII:  Intact to voice bilaterally.  IX:  Pushes tongue against bilateral cheeks.  X:  Palate elevates, uvula midline,  phonation intact.  XI: Elevates shoulders, head turn intact.  XII: Tongue midline. No fasciculations.  *  Cerebellar:  Finger nose accurate. Heel shin intact.   Rapid alternating movements smooth.  *  Brien-cranial:    No drift.  *  Cervical Spine:  DTR's at Biceps, Triceps, and Brachioradialis 2/4 and symmetric.  Sensation intact.    No Knowles's. No Phalen's.  No Tinel's. No fasciculations.   Muscle bulk and tone WNL.  Upper Extremity Strength.              RIGHT                LEFT     Deltoid              5/5                   5/5       Biceps              5/5                   5/5        Triceps              5/5                   5/5       Wrist Extensor              4/5                   4/5                     5/5                    5/5       Interossei              5/5                   5/5       EPL              5/5                    5/5       Pinch              5/5                  5/5          *  Lumbar Spine:    DTR's Patellar and Achilles 1/4 and symmetric.   No fasciculations.  Muscle bulk and tone WNL.  No Clonus.  Sensation intact.    Lower Extremity Strength                   RIGHT                   LEFT     Iliopsoas                    5/5                      5/5       Quad                    5/5                        5/5       Hamstring                      5/5                        5/5         Gastrocs                    5/5                        5/5       Tib. Anterior                     5/5                        5/5       EHL                      5/5                        5/5       Babinski                 Down                                      Down                   *  Structural Exam  Inspection of the spine reveals no scoliosis, exaggerated kyphosis or lordosis.  Head is balanced over the pelvis in the coronal and sagittal plane.    Cervical spine ROM full. No spasming. No tenderness to palpation.  No Spurling's or L'Hermitte's.   Lumbar ROM full.  Feet are warm.  Gait narrow,  smooth, no scissoring.  Negative Romberg.     ASSESSMENT:  1. Spinal stenosis in cervical region    2. Cervical radiculopathy due to osteoarthritis of spine    3.      Mr. Kenney has multiple levels of cervical spondylosis, most remarkably left C3-4, bilateral C4-5, right greater than left C5-6, bilateral C6-7.  He has central stenosis at multiple levels but not severe.  He has most significantly pain and tingling into the fingers in the C7 distribution.  I believe that is probably has primarily symptomatic level, although others could be contributing as well.  At this point, we are going to try to manage this nonoperatively, even given the triceps weakness noted.  We will start him Dosepak, physical therapy and home traction unit and will follow up in about 6 weeks.  If he has made no improvement and his triceps are still weak, then we will get him off to see the neurosurgeon for a surgical discussion.  He is happy with this plan.  He does not want to have surgery if he can possibly avoid it, and I agree.      I have answered all of his questions.  The questions indicated a good understanding of the discussion.  We have supplied him with business cards and telephone numbers and urged him to call should he have any questions, comments or concerns.      It has been our pleasure looking after this nice patient.  We appreciate your confidence in [facility] Department of Neurosurgery.       Best Regards,    Emely Shah PA-C  [facility]  Department of Neurosurgery  Phone: [phone]  Fax: [phone]

## 2017-08-21 ENCOUNTER — CARE COORDINATION (OUTPATIENT)
Dept: NEUROSURGERY | Facility: CLINIC | Age: 52
End: 2017-08-21

## 2017-08-21 DIAGNOSIS — I10 ESSENTIAL HYPERTENSION WITH GOAL BLOOD PRESSURE LESS THAN 140/90: ICD-10-CM

## 2017-08-21 RX ORDER — LISINOPRIL 30 MG/1
30 TABLET ORAL DAILY
Qty: 90 TABLET | Refills: 3 | Status: SHIPPED | OUTPATIENT
Start: 2017-08-21 | End: 2019-04-03

## 2017-08-21 NOTE — TELEPHONE ENCOUNTER
Lisinopril 20mg tablets      Last Written Prescription Date: 05/18/2017  Last Fill Quantity: 90, # refills: 3  Last Office Visit with G, P or [facility] prescribing provider: 07/11/2017       Potassium   Date Value Ref Range Status   05/18/2017 4.6 3.4 - 5.3 mmol/L Final     Creatinine   Date Value Ref Range Status   05/18/2017 1.21 0.66 - 1.25 mg/dL Final     BP Readings from Last 3 Encounters:   06/07/17 (!) 143/99   05/18/17 (!) 142/92   10/06/16 137/85     Thank You,    Lj Tello  Pharmacy Technician  [facility]  Phone: [phone]  Fax: [phone]

## 2017-08-21 NOTE — TELEPHONE ENCOUNTER
Routing refill request to provider for review/approval because:  BP out of range    Gerri Farnsworth RN   [facility]

## 2018-01-29 ENCOUNTER — TELEPHONE (OUTPATIENT)
Dept: FAMILY MEDICINE | Facility: CLINIC | Age: 53
End: 2018-01-29

## 2018-01-29 DIAGNOSIS — M48.02 SPINAL STENOSIS IN CERVICAL REGION: ICD-10-CM

## 2018-01-29 DIAGNOSIS — M75.50 BURSITIS OF SHOULDER, UNSPECIFIED LATERALITY: Primary | ICD-10-CM

## 2018-01-29 RX ORDER — METHYLPREDNISOLONE 4 MG
TABLET, DOSE PACK ORAL
Qty: 21 TABLET | Refills: 0 | Status: SHIPPED | OUTPATIENT
Start: 2018-01-29 | End: 2019-04-03

## 2018-01-29 NOTE — TELEPHONE ENCOUNTER
patient reporte recurrence of symptoms    Ok refill times 1  Orders Placed This Encounter     methylPREDNISolone (MEDROL DOSEPAK) 4 MG tablet     Sig: Follow package instructions     Dispense:  21 tablet     Refill:  0     If not resolving he will see Ortho

## 2018-07-05 ENCOUNTER — OFFICE VISIT (OUTPATIENT)
Dept: FAMILY MEDICINE | Facility: CLINIC | Age: 53
End: 2018-07-05
Payer: COMMERCIAL

## 2018-07-05 VITALS
HEART RATE: 84 BPM | BODY MASS INDEX: 27.72 KG/M2 | SYSTOLIC BLOOD PRESSURE: 127 MMHG | OXYGEN SATURATION: 98 % | WEIGHT: 177 LBS | TEMPERATURE: 98 F | DIASTOLIC BLOOD PRESSURE: 88 MMHG

## 2018-07-05 DIAGNOSIS — M10.072 ACUTE IDIOPATHIC GOUT OF LEFT FOOT: Primary | ICD-10-CM

## 2018-07-05 DIAGNOSIS — I10 ESSENTIAL HYPERTENSION WITH GOAL BLOOD PRESSURE LESS THAN 140/90: ICD-10-CM

## 2018-07-05 LAB
ALBUMIN SERPL-MCNC: 4.1 G/DL (ref 3.4–5)
ALP SERPL-CCNC: 52 U/L (ref 40–150)
ALT SERPL W P-5'-P-CCNC: 31 U/L (ref 0–70)
ANION GAP SERPL CALCULATED.3IONS-SCNC: 8 MMOL/L (ref 3–14)
AST SERPL W P-5'-P-CCNC: 21 U/L (ref 0–45)
BILIRUB SERPL-MCNC: 0.5 MG/DL (ref 0.2–1.3)
BUN SERPL-MCNC: 21 MG/DL (ref 7–30)
CALCIUM SERPL-MCNC: 9.2 MG/DL (ref 8.5–10.1)
CHLORIDE SERPL-SCNC: 108 MMOL/L (ref 94–109)
CO2 SERPL-SCNC: 25 MMOL/L (ref 20–32)
CREAT SERPL-MCNC: 1.16 MG/DL (ref 0.66–1.25)
CREAT UR-MCNC: 231 MG/DL
GFR SERPL CREATININE-BSD FRML MDRD: 66 ML/MIN/1.7M2
GLUCOSE SERPL-MCNC: 74 MG/DL (ref 70–99)
MICROALBUMIN UR-MCNC: 17 MG/L
MICROALBUMIN/CREAT UR: 7.32 MG/G CR (ref 0–17)
POTASSIUM SERPL-SCNC: 4.4 MMOL/L (ref 3.4–5.3)
PROT SERPL-MCNC: 7.6 G/DL (ref 6.8–8.8)
SODIUM SERPL-SCNC: 141 MMOL/L (ref 133–144)
URATE SERPL-MCNC: 6.1 MG/DL (ref 3.5–7.2)

## 2018-07-05 PROCEDURE — 84550 ASSAY OF BLOOD/URIC ACID: CPT | Performed by: FAMILY MEDICINE

## 2018-07-05 PROCEDURE — 80053 COMPREHEN METABOLIC PANEL: CPT | Performed by: FAMILY MEDICINE

## 2018-07-05 PROCEDURE — 82043 UR ALBUMIN QUANTITATIVE: CPT | Performed by: FAMILY MEDICINE

## 2018-07-05 PROCEDURE — 36415 COLL VENOUS BLD VENIPUNCTURE: CPT | Performed by: FAMILY MEDICINE

## 2018-07-05 PROCEDURE — 99213 OFFICE O/P EST LOW 20 MIN: CPT | Performed by: FAMILY MEDICINE

## 2018-07-05 RX ORDER — INDOMETHACIN 50 MG/1
50 CAPSULE ORAL
Qty: 30 CAPSULE | Refills: 1 | Status: SHIPPED | OUTPATIENT
Start: 2018-07-05 | End: 2019-04-03

## 2018-07-05 NOTE — PROGRESS NOTES
SUBJECTIVE:   Baron HUSSEIN Kenney is a 52 year old male who presents to clinic today for the following health issues:    Joint Pain    Onset: 1 year , flares up on and off    Description:   Location: Left big toe   Character:was a  Sharp/piercing pain in toe, cramping in calf     Intensity: 9/10 last week, moderate/tolerable now     Progression of Symptoms: improved    Accompanying Signs & Symptoms:  Other symptoms: when painful /swollwn it radiation of pain to calf, swelling and redness    History:   Previous similar pain: no       Precipitating factors:   Trauma or overuse: YES- 1 year ago moving a piano. Right foot was injured worse at the time     Alleviating factors:  Improved by: nothing    Therapies Tried and outcome: Ibuprofen not helpful         Hypertension Follow-up      Outpatient blood pressures are not being checked.    Low Salt Diet: no added salt      Problem list and histories reviewed & adjusted, as indicated.  Additional history: as documented    Labs reviewed in EPIC    Reviewed and updated as needed this visit by clinical staff       Reviewed and updated as needed this visit by Provider         ROS:  Constitutional, HEENT, cardiovascular, pulmonary, gi and gu systems are negative, except as otherwise noted.    OBJECTIVE:     /88 (BP Location: Left arm, Patient Position: Sitting, Cuff Size: Adult Regular)  Pulse 84  Temp 98  F (36.7  C) (Oral)  Wt 177 lb (80.3 kg)  SpO2 98%  BMI 27.72 kg/m2  Body mass index is 27.72 kg/(m^2).  GENERAL: healthy, alert and no distress  NECK: no adenopathy, no asymmetry, masses, or scars and thyroid normal to palpation  RESP: lungs clear to auscultation - no rales, rhonchi or wheezes  CV: regular rate and rhythm, normal S1 S2, no S3 or S4, no murmur, click or rub, no peripheral edema and peripheral pulses strong  MS: tenderness to palpation left great toe MP joint with slight effusion, no redness  SKIN: no suspicious lesions or  colette        ASSESSMENT/PLAN:             1. Acute idiopathic gout of left foot  Per history  - Comprehensive metabolic panel  - Uric acid  - indomethacin (INDOCIN) 50 MG capsule; Take 1 capsule (50 mg) by mouth 3 times daily (with meals)  For 3-5 days  Dispense: 30 capsule; Refill: 1    2. Essential hypertension with goal blood pressure less than 140/90  Well controlled   - Comprehensive metabolic panel  - Albumin Random Urine Quantitative with Creat Ratio  - Lipid Profile; Future    Regular exercise  Gout diet  See Patient Instructions    Cj Villanueva MD  [facility]

## 2018-07-05 NOTE — MR AVS SNAPSHOT
"              After Visit Summary   7/5/2018    Baron HUSSEIN Kenney    MRN: [id number]           Patient Information     Date Of Birth          1965        Visit Information        Provider Department      7/5/2018 11:00 AM Cj Villanueva MD [facility]        Today's Diagnoses     Acute idiopathic gout of left foot    -  1    Essential hypertension with goal blood pressure less than 140/90           Follow-ups after your visit        Future tests that were ordered for you today     Open Future Orders        Priority Expected Expires Ordered    Lipid Profile Routine  7/5/2019 7/5/2018            Who to contact     If you have questions or need follow up information about today's clinic visit or your schedule please contact [facility] directly at [phone].  Normal or non-critical lab and imaging results will be communicated to you by MyChart, letter or phone within 4 business days after the clinic has received the results. If you do not hear from us within 7 days, please contact the clinic through MyChart or phone. If you have a critical or abnormal lab result, we will notify you by phone as soon as possible.  Submit refill requests through [facility] or call your pharmacy and they will forward the refill request to us. Please allow 3 business days for your refill to be completed.          Additional Information About Your Visit        MyChart Information     [facility] lets you send messages to your doctor, view your test results, renew your prescriptions, schedule appointments and more. To sign up, go to [email or web address] . Click on \"Log in\" on the left side of the screen, which will take you to the Welcome page. Then click on \"Sign up Now\" on the right side of the page.     You will be asked to enter the access code listed below, as well as some personal information. Please follow the directions to create your username and password.     Your access code is: " [email or web address]  Expires: 10/3/2018 11:47 AM     Your access code will  in 90 days. If you need help or a new code, please call your [place] clinic or [phone].        Care EveryWhere ID     This is your Care EveryWhere ID. This could be used by other organizations to access your [place] medical records  [id number]        Your Vitals Were     Pulse Temperature Pulse Oximetry BMI (Body Mass Index)          84 98  F (36.7  C) (Oral) 98% 27.72 kg/m2         Blood Pressure from Last 3 Encounters:   18 127/88   17 (!) 143/99   17 (!) 142/92    Weight from Last 3 Encounters:   18 177 lb (80.3 kg)   17 169 lb 6.4 oz (76.8 kg)   17 174 lb 9.6 oz (79.2 kg)              We Performed the Following     Albumin Random Urine Quantitative with Creat Ratio     Comprehensive metabolic panel     Uric acid          Today's Medication Changes          These changes are accurate as of 18 11:47 AM.  If you have any questions, ask your nurse or doctor.               Start taking these medicines.        Dose/Directions    indomethacin 50 MG capsule   Commonly known as:  INDOCIN   Used for:  Acute idiopathic gout of left foot   Started by:  Cj Villanueva MD        Dose:  50 mg   Take 1 capsule (50 mg) by mouth 3 times daily (with meals)   Quantity:  30 capsule   Refills:  1            Where to get your medicines      These medications were sent to [facility] [address]  [address], [facility] [address]     Phone:  [phone]     indomethacin 50 MG capsule                Primary Care Provider Office Phone # Fax #    Cj Villanueva [phone]       [address]  [facility] [address]        Equal Access to Services     DEIDRE MORTON : Jasiel bellamy Soatif, waaxda luqadaha, qaybta kaalmada adeegyada, steve fortune. [facility] [phone].    ATENCIÓN: Si  dede carrasco, tiene a michele disposición servicios gratuitos de asistencia lingüística. Beatris albert [phone].    We comply with applicable federal civil rights laws and [place] laws. We do not discriminate on the basis of race, color, national origin, age, disability, sex, sexual orientation, or gender identity.            Thank you!     Thank you for choosing [facility]  for your care. Our goal is always to provide you with excellent care. Hearing back from our patients is one way we can continue to improve our services. Please take a few minutes to complete the written survey that you may receive in the mail after your visit with us. Thank you!             Your Updated Medication List - Protect others around you: Learn how to safely use, store and throw away your medicines at www.disposemymeds.org.          This list is accurate as of 7/5/18 11:47 AM.  Always use your most recent med list.                   Brand Name Dispense Instructions for use Diagnosis    amLODIPine 5 MG tablet    NORVASC    90 tablet    Take 2 tablets (10 mg) by mouth daily    Essential hypertension with goal blood pressure less than 140/90       aspirin 81 MG tablet     90 tablet    Take 1 tablet (81 mg) by mouth daily    Essential hypertension with goal blood pressure less than 140/90       Cervical Traction Kit     1 kit    Use as directed    Spinal stenosis in cervical region       indomethacin 50 MG capsule    INDOCIN    30 capsule    Take 1 capsule (50 mg) by mouth 3 times daily (with meals)    Acute idiopathic gout of left foot       lisinopril 30 MG tablet    PRINIVIL,ZESTRIL    90 tablet    Take 1 tablet (30 mg) by mouth daily    Essential hypertension with goal blood pressure less than 140/90       methylPREDNISolone 4 MG tablet    MEDROL DOSEPAK    21 tablet    Follow package instructions    Spinal stenosis in cervical region       omeprazole 20 MG tablet     90 tablet    Take 1 tablet (20 mg) by mouth daily     Gastroesophageal reflux disease, esophagitis presence not specified

## 2019-04-02 ENCOUNTER — APPOINTMENT (OUTPATIENT)
Dept: MRI IMAGING | Facility: CLINIC | Age: 54
End: 2019-04-02
Attending: EMERGENCY MEDICINE
Payer: COMMERCIAL

## 2019-04-02 ENCOUNTER — ALLIED HEALTH/NURSE VISIT (OUTPATIENT)
Dept: NURSING | Facility: CLINIC | Age: 54
End: 2019-04-02
Payer: COMMERCIAL

## 2019-04-02 ENCOUNTER — HOSPITAL ENCOUNTER (EMERGENCY)
Facility: CLINIC | Age: 54
Discharge: HOME | End: 2019-04-02
Attending: EMERGENCY MEDICINE | Admitting: EMERGENCY MEDICINE
Payer: COMMERCIAL

## 2019-04-02 VITALS — SYSTOLIC BLOOD PRESSURE: 160 MMHG | DIASTOLIC BLOOD PRESSURE: 118 MMHG

## 2019-04-02 VITALS
HEART RATE: 80 BPM | TEMPERATURE: 98.1 F | DIASTOLIC BLOOD PRESSURE: 108 MMHG | SYSTOLIC BLOOD PRESSURE: 156 MMHG | OXYGEN SATURATION: 100 %

## 2019-04-02 DIAGNOSIS — R42 VERTIGO: ICD-10-CM

## 2019-04-02 DIAGNOSIS — I10 HYPERTENSION, UNSPECIFIED TYPE: ICD-10-CM

## 2019-04-02 DIAGNOSIS — Z01.30 BP CHECK: Primary | ICD-10-CM

## 2019-04-02 LAB
ANION GAP SERPL CALCULATED.3IONS-SCNC: 6 MMOL/L (ref 3–14)
BASOPHILS # BLD AUTO: 0 10E9/L (ref 0–0.2)
BASOPHILS NFR BLD AUTO: 1 %
BUN SERPL-MCNC: 14 MG/DL (ref 7–30)
CALCIUM SERPL-MCNC: 8.6 MG/DL (ref 8.5–10.1)
CHLORIDE SERPL-SCNC: 107 MMOL/L (ref 94–109)
CO2 SERPL-SCNC: 30 MMOL/L (ref 20–32)
CREAT SERPL-MCNC: 1.19 MG/DL (ref 0.66–1.25)
DIFFERENTIAL METHOD BLD: NORMAL
EOSINOPHIL # BLD AUTO: 0.2 10E9/L (ref 0–0.7)
EOSINOPHIL NFR BLD AUTO: 4.3 %
ERYTHROCYTE [DISTWIDTH] IN BLOOD BY AUTOMATED COUNT: 13.9 % (ref 10–15)
GFR SERPL CREATININE-BSD FRML MDRD: 70 ML/MIN/{1.73_M2}
GLUCOSE SERPL-MCNC: 101 MG/DL (ref 70–99)
HCT VFR BLD AUTO: 42.5 % (ref 40–53)
HGB BLD-MCNC: 14.1 G/DL (ref 13.3–17.7)
IMM GRANULOCYTES # BLD: 0 10E9/L (ref 0–0.4)
IMM GRANULOCYTES NFR BLD: 0.2 %
LYMPHOCYTES # BLD AUTO: 1.8 10E9/L (ref 0.8–5.3)
LYMPHOCYTES NFR BLD AUTO: 44.2 %
MCH RBC QN AUTO: 29.6 PG (ref 26.5–33)
MCHC RBC AUTO-ENTMCNC: 33.2 G/DL (ref 31.5–36.5)
MCV RBC AUTO: 89 FL (ref 78–100)
MONOCYTES # BLD AUTO: 0.4 10E9/L (ref 0–1.3)
MONOCYTES NFR BLD AUTO: 8.7 %
NEUTROPHILS # BLD AUTO: 1.7 10E9/L (ref 1.6–8.3)
NEUTROPHILS NFR BLD AUTO: 41.6 %
NRBC # BLD AUTO: 0 10*3/UL
NRBC BLD AUTO-RTO: 0 /100
PLATELET # BLD AUTO: 278 10E9/L (ref 150–450)
POTASSIUM SERPL-SCNC: 4.2 MMOL/L (ref 3.4–5.3)
RBC # BLD AUTO: 4.76 10E12/L (ref 4.4–5.9)
SODIUM SERPL-SCNC: 143 MMOL/L (ref 133–144)
WBC # BLD AUTO: 4.2 10E9/L (ref 4–11)

## 2019-04-02 PROCEDURE — A9585 GADOBUTROL INJECTION: HCPCS | Performed by: EMERGENCY MEDICINE

## 2019-04-02 PROCEDURE — 70544 MR ANGIOGRAPHY HEAD W/O DYE: CPT

## 2019-04-02 PROCEDURE — 70553 MRI BRAIN STEM W/O & W/DYE: CPT

## 2019-04-02 PROCEDURE — 93010 ELECTROCARDIOGRAM REPORT: CPT | Mod: Z6 | Performed by: EMERGENCY MEDICINE

## 2019-04-02 PROCEDURE — 93005 ELECTROCARDIOGRAM TRACING: CPT | Performed by: EMERGENCY MEDICINE

## 2019-04-02 PROCEDURE — 99207 ZZC NO CHARGE NURSE ONLY: CPT

## 2019-04-02 PROCEDURE — 99285 EMERGENCY DEPT VISIT HI MDM: CPT | Mod: 25 | Performed by: EMERGENCY MEDICINE

## 2019-04-02 PROCEDURE — 70549 MR ANGIOGRAPH NECK W/O&W/DYE: CPT

## 2019-04-02 PROCEDURE — 80048 BASIC METABOLIC PNL TOTAL CA: CPT | Performed by: EMERGENCY MEDICINE

## 2019-04-02 PROCEDURE — 85025 COMPLETE CBC W/AUTO DIFF WBC: CPT | Performed by: EMERGENCY MEDICINE

## 2019-04-02 PROCEDURE — 25500064 ZZH RX 255 OP 636: Performed by: EMERGENCY MEDICINE

## 2019-04-02 RX ORDER — GADOBUTROL 604.72 MG/ML
10 INJECTION INTRAVENOUS ONCE
Status: COMPLETED | OUTPATIENT
Start: 2019-04-02 | End: 2019-04-02

## 2019-04-02 RX ORDER — LIDOCAINE 40 MG/G
CREAM TOPICAL
Status: DISCONTINUED | OUTPATIENT
Start: 2019-04-02 | End: 2019-04-02 | Stop reason: HOSPADM

## 2019-04-02 RX ADMIN — GADOBUTROL 8 ML: 604.72 INJECTION INTRAVENOUS at 12:05

## 2019-04-02 ASSESSMENT — ENCOUNTER SYMPTOMS
SPEECH DIFFICULTY: 0
NUMBNESS: 0
FACIAL ASYMMETRY: 0
SHORTNESS OF BREATH: 0
SEIZURES: 0
NAUSEA: 1
WEAKNESS: 0
HEADACHES: 1
DIZZINESS: 1

## 2019-04-02 NOTE — PROGRESS NOTES
SUBJECTIVE:   Baron HUSSEIN Kenney is a 53 year old male who presents to clinic today for the following health issues:    Hypertension Follow-up  Patient had BP taken yesterday at work in the ED, was told to come see his doctor asap  We saw him for a nurse only and his blood pressures were high both times taken. Triaged by nurse and advised to go back down to check in to ER.   They did blood work and an EKG      Outpatient blood pressures are being checked at work.  Results are high.    Low Salt Diet: not monitoring salt      Amount of exercise or physical activity: 2-3 days/week for an average of 30-45 minutes    Problems taking medications regularly: Yes,  [facility] Lis[facility] a few months back due to possible cancer causing ingredients    Medication side effects: none    Diet: Only eats once per day at 7:30pm    Patient reports that he stopped taking his Lisinopril and Norvasc some time in 2017.  Was at work on 4/2/19 and had to go to the ED BP was 178/115 and 163/ 117.  Patient stated that he is experiencing intermittent nausea and dizziness. Denies SOB and palpitation, no chest pain. Takes banana, apple and oranges  For blood pressure. Do not want to take Lisinopril because he is worried it can cause cancer.      Had some pain and swelling in his right leg recently in calf area but pain subsided on its own after a day or so, no edema or redness or pain currently    Problem list and histories reviewed & adjusted, as indicated.  Additional history: as documented    Patient Active Problem List   Diagnosis     DDD (degenerative disc disease), cervical     Lumbar radiculopathy     CARDIOVASCULAR SCREENING; LDL GOAL LESS THAN 130     Chest pain     CARDIOVASCULAR SCREENING; LDL GOAL LESS THAN 160     Essential hypertension with goal blood pressure less than 140/90     Gastroesophageal reflux disease, esophagitis presence not specified     Acute idiopathic gout of left foot     History reviewed. No pertinent  surgical history.    Social History     Tobacco Use     Smoking status: Never Smoker     Smokeless tobacco: Never Used   Substance Use Topics     Alcohol use: Yes     Alcohol/week: 0.6 - 1.2 oz     Types: 1 - 2 Cans of beer per week     Comment: PER YEAR     Family History   Problem Relation Age of Onset     Heart Disease Other         4 cousins with stents at an early age     Diabetes Maternal Grandfather      Diabetes Paternal Grandfather      Hypertension Father            Reviewed and updated as needed this visit by clinical staff  Allergies  Meds       Reviewed and updated as needed this visit by Provider         ROS:  Constitutional, HEENT, cardiovascular, pulmonary, gi and gu systems are negative, except as otherwise noted.    OBJECTIVE:     BP (!) 159/105   Pulse 104   Temp 98.7  F (37.1  C) (Oral)   Resp 14   Wt 81 kg (178 lb 8 oz)   SpO2 99%   BMI 27.96 kg/m    Body mass index is 27.96 kg/m .  GENERAL: healthy, alert and no distress  EYES: Eyes grossly normal to inspection, PERRL and conjunctivae and sclerae normal  RESP: lungs clear to auscultation - no rales, rhonchi or wheezes  CV: regular rate and rhythm, normal S1 S2, no S3 or S4, no murmur, click or rub, no peripheral edema and peripheral pulses strong, homans sign negative   MS: no abnormalities full rom of ankles       ASSESSMENT/PLAN:     (I10) Essential hypertension with goal blood pressure less than 140/90  Comment: uncontrolled, was seen in ER yesterday and hyptertensive off his med   Plan: amLODIPine (NORVASC) 5 MG tablet        Follow up with one week for BP check with nurse  Consider adding ARB pt agreeable to try this rather than lisinopril       Patient Instructions     Patient Education     Eating Heart-Healthy Foods  Eating has a big impact on your heart health. In fact, eating healthier can improve several of your heart risks at once. For instance, it helps you manage weight, cholesterol, and blood pressure. Here are ideas to  help you make heart-healthy changes without giving up all the foods and flavors you love.  Getting started    Talk with your healthcare provider about eating plans, such as the DASH or Mediterranean diet. You may also be referred to a dietitian.    Change a few things at a time. Give yourself time to get used to a few eating changes before adding more.    Work to create a tasty, healthy eating plan that you can stick to for the rest of your life.    Goals for healthy eating  Below are some tips to improve your eating habits:    Limit saturated fats and trans fats. Saturated fats raise your levels of cholesterol, so keep these fats to a minimum. They are found in foods such as fatty meats, whole milk, cheese, and palm and coconut oils. Avoid trans fats because they lower good cholesterol as well as raise bad cholesterol. Trans fats are most often found in processed foods.    Reduce sodium (salt) intake. Eating too much salt may increase your blood pressure. Limit your sodium intake to 2,300 milligrams (mg) per day (the amount in 1 teaspoon of salt), or less if your healthcare provider recommends it. Dining out less often and eating fewer processed foods are two great ways to decrease the amount of salt you consume.    Managing calories. A calorie is a unit of energy. Your body burns calories for fuel, but if you eat more calories than your body burns, the extras are stored as fat. Your healthcare provider can help you create a diet plan to manage your calories. This will likely include eating healthier foods as well as exercising regularly. To help you track your progress, keep a diary to record what you eat and how often you exercise.  Choose the right foods  Aim to make these foods staples of your diet. If you have diabetes, you may have different recommendations than what is listed here:    Fruits and vegetables provide plenty of nutrients without a lot of calories. At meals, fill half your plate with these foods.  Split the other half of your plate between whole grains and lean protein.    Whole grains are high in fiber and rich in vitamins and nutrients. Good choices include whole-wheat bread, pasta, and brown rice.    Lean proteins give you nutrition with less fat. Good choices include fish, skinless chicken, and beans.    Low-fat or nonfat dairy provides nutrients without a lot of fat. Try low-fat or nonfat milk, cheese, or yogurt.    Healthy fats can be good for you in small amounts. These are unsaturated fats, such as olive oil, nuts, and fish. Try to have at least 2 servings per week of fatty fish, such as salmon, sardines, mackerel, rainbow trout, and albacore tuna. These contain omega-3 fatty acids, which are good for your heart. Flaxseed is another source of a heart-healthy fat.  More on heart-healthy eating  Read food labels  Healthy eating starts at the grocery store. Be sure to pay attention to food labels on packaged foods. Look for products that are high in fiber and protein, and low in saturated fat, cholesterol, and sodium. Avoid products that contain trans fat. And pay close attention to serving size. For instance, if you plan to eat two servings, double all the numbers on the label.  Prepare food right  A key part of healthy cooking is cutting down on added fat and salt. Look on the internet for lower-fat, lower-sodium recipes. Also, try these tips:    Remove fat from meat and skin from poultry before cooking.    Skim fat from the surface of soups and sauces.    Broil, boil, bake, steam, grill, and microwave food without added fats.    Choose ingredients that spice up your food without adding calories, fat, or sodium. Try these items: horseradish, hot sauce, lemon, mustard, nonfat salad dressings, and vinegar. For salt-free herbs and spices, try basil, cilantro, cinnamon, pepper, and rosemary.  Date Last Reviewed: 10/1/2017    1526-1698 [employer]. [address] [place] [address], [place] [address].  All rights reserved. This information is not intended as a substitute for professional medical care. Always follow your healthcare professional's instructions.             Bonny Reid DNP Student    I was present with the NP Student during the history and exam.  I discussed the case with the NP Student and agree with the findings as documented in the assessment and plan.     BEN Mendoza [facility]

## 2019-04-02 NOTE — ED NOTES
"Pt. Presents with c/o of \"the spins\" since he has got off of his lisinopril medication\" which was about 7 months ago. The other day he says he was seeing double piano keyboards, so he went and laid down for a nap. He states he has had bouts of nausea as well. He does have an appt. Scheduled with him PCP tomorrow morning.   "

## 2019-04-02 NOTE — LETTER
April 2, 2019      To Whom It May Concern:      Baron HUSSEIN Kenney was seen in our Emergency Department today, 04/02/19.  I expect his condition to improve over the next 1-2 days.  He may return to work/school when improved.    Sincerely,        James Roe MD

## 2019-04-02 NOTE — PROGRESS NOTES
MA asked writer to triage and evaluate patient as blood pressure is still high.    Patient states he was at work today,  at [facility] ED. Patient reports he had headache, nausea and dizziness. A coworker evaluated his blood pressure and it read 165/115. The coworker told the patient to see his primary care doctor immediately, so patient walked up to clinic    Patient reports dizziness and nausea, he states the headache has resolved. He denies visual changes, chest pain or difficulty breathing.    Writer advised patient to go back to the emergency room and check in as a patient related to hypertension and symptoms. Patient verbalized understanding and is in agreement with plan. Patient states he will walk back down to the ED for evaluation. Patient states he feels safe to transport himself there    Patient has appointment scheduled 04/03/2019 in clinic      Chula Guevara RN  Triage Nurse

## 2019-04-02 NOTE — DISCHARGE INSTRUCTIONS
Follow-up tomorrow morning in clinic as scheduled for management of blood pressure.    If episodes of dizziness continue, have your primary clinic refer you to the balance center for further evaluation.    Return to the emergency department for any problems.

## 2019-04-02 NOTE — ED AVS SNAPSHOT
[facility], Emergency Department  [address]  [place] [address]  Phone:  [phone]  Fax:  [phone]                                    Baron HUSSEIN Kenney   MRN: [id number]    Department:  [facility], Emergency Department   Date of Visit:  4/2/2019           After Visit Summary Signature Page    I have received my discharge instructions, and my questions have been answered. I have discussed any challenges I see with this plan with the nurse or doctor.    ..........................................................................................................................................  Patient/Patient Representative Signature      ..........................................................................................................................................  Patient Representative Print Name and Relationship to Patient    ..................................................               ................................................  Date                                   Time    ..........................................................................................................................................  Reviewed by Signature/Title    ...................................................              ..............................................  Date                                               Time          22EPIC Rev 08/18

## 2019-04-02 NOTE — PROGRESS NOTES
First blood pressure: 150/118 at 10:06 am  Second blood pressure: 160/118 at 10:11 am    Patient stopped taking Lis[facility] a few months ago [facility] after hearing of a recall

## 2019-04-02 NOTE — ED PROVIDER NOTES
[facility] EMERGENCY DEPARTMENT ([facility])    4/02/19        History     Chief Complaint   Patient presents with     Hypertension     Pt has had intermittent nausea and dizziness for past few days. Pt was on lisinopril but stopped about 6 months ago      The history is provided by the patient and medical records.     Baron HUSSEIN Kenney is a 53 year old male with a past medical history significant for HTN and GERD who presents to the Emergency Department today due to high blood pressure, nausea and dizziness. Patient is a  here in the ED. Patient has had intermittent nausea and dizziness the past few day. Patient reports that while at work he began to get dizzy and having the spins. He reports that he has had these feelings in the past but today his symptoms are worse than they have ever been. Patient took his blood pressure 3 times before checking into the ED the first two times it went up each time, and the last was 163/110. Patient reports that yesterday he was playing the piano and he started to experience double vision. Patient has noted a headache as well. Patient has been off lisinopril for the past 6 months. Patients PCP is not here today. Patient has an appointment tomorrow at his clinic.    I have reviewed the Medications, Allergies, Past Medical and Surgical History, and Social History in the [employer] system.    Past Medical History:   Diagnosis Date     Gastro-oesophageal reflux disease      Hypertension        History reviewed. No pertinent surgical history.    Family History   Problem Relation Age of Onset     Heart Disease Other         4 cousins with stents at an early age     Diabetes Maternal Grandfather      Diabetes Paternal Grandfather      Hypertension Father        Social History     Tobacco Use     Smoking status: Never Smoker     Smokeless tobacco: Never Used   Substance Use Topics     Alcohol use: Yes     Alcohol/week: 0.6 - 1.2 oz     Types: 1 - 2 Cans of beer per week      Comment: PER YEAR       Current Facility-Administered Medications   Medication     lidocaine (LMX4) cream     lidocaine 1 % 0.1-1 mL     sodium chloride (PF) 0.9% PF flush 3 mL     sodium chloride (PF) 0.9% PF flush 3 mL     Current Outpatient Medications   Medication     amLODIPine (NORVASC) 5 MG tablet     aspirin 81 MG tablet     indomethacin (INDOCIN) 50 MG capsule     lisinopril (PRINIVIL,ZESTRIL) 30 MG tablet     methylPREDNISolone (MEDROL DOSEPAK) 4 MG tablet     Misc. Devices (CERVICAL TRACTION) KIT     omeprazole 20 MG tablet      No Known Allergies      Review of Systems   Eyes: Positive for visual disturbance.   Respiratory: Negative for shortness of breath.    Cardiovascular: Negative for chest pain.   Gastrointestinal: Positive for nausea.   Musculoskeletal: Negative for gait problem.   Neurological: Positive for dizziness and headaches. Negative for seizures, facial asymmetry, speech difficulty, weakness and numbness.   All other systems reviewed and are negative.      Physical Exam   BP: (!) 163/117  Pulse: 80  Heart Rate: 88  Temp: 97.5  F (36.4  C)  SpO2: 98 %      Physical Exam   Constitutional: He is oriented to person, place, and time. Vital signs are normal. He appears well-developed and well-nourished.  Non-toxic appearance. He does not appear ill. No distress.   HENT:   Head: Normocephalic and atraumatic.   Mouth/Throat: Oropharynx is clear and moist. No oropharyngeal exudate.   Eyes: Conjunctivae and EOM are normal. Pupils are equal, round, and reactive to light. No scleral icterus.   Neck: Normal range of motion. Neck supple. No JVD present. No tracheal deviation present. No thyromegaly present.   Cardiovascular: Normal rate, regular rhythm, normal heart sounds and intact distal pulses. Exam reveals no gallop and no friction rub.   No murmur heard.  Pulmonary/Chest: Effort normal and breath sounds normal. No respiratory distress.   Musculoskeletal: Normal range of motion. He exhibits no  edema or tenderness.   Lymphadenopathy:     He has no cervical adenopathy.   Neurological: He is alert and oriented to person, place, and time. He has normal strength. No cranial nerve deficit or sensory deficit.   No pronator drift bilaterally.  Normal rapid alternating movements bilaterally.  Normal finger to nose bilaterally.  No aphasia or dysarthria noted.  Normal steady gait noted.   Skin: Skin is warm and dry. No rash noted. No erythema. No pallor.   Psychiatric: He has a normal mood and affect. His behavior is normal.   Nursing note and vitals reviewed.      ED Course   10:48 AM  The patient was seen and examined by James Roe MD in Room ED02.        Procedures             EKG Interpretation:      Interpreted by James Roe    Symptoms at time of EKG: Hypertension, dizziness  Rhythm: normal sinus   Rate: 84  Ectopy: none  Conduction: Possible LVH  ST Segments/ T Waves: No ST-T wave changes  Q Waves: none  Comparison to prior: Unchanged    Clinical Impression: Possible LVH, otherwise normal EKG          Results for orders placed or performed during the hospital encounter of 04/02/19   MR Brain w/o & w Contrast    Narrative    MRI OF THE BRAIN WITHOUT AND WITH CONTRAST 4/2/2019 12:51 PM     COMPARISON: None.    HISTORY: Dizziness, nausea, double vision. Evaluate for stroke.     TECHNIQUE: Multi-sequence, multi-planar MRI images of the brain were  acquired before and after the administration of IV gadolinium (8 mL IV  Gadavist).    FINDINGS: The ventricles and basal cisterns are normal in  configuration. There is no midline shift. There are no extra-axial  fluid collections. Gray-white differentiation is well maintained.  There is no evidence for stroke or acute intracranial hemorrhage.  There is no abnormal contrast enhancement in the brain or its  coverings.    There is no sinusitis or mastoiditis.      Impression    IMPRESSION: Normal brain MRI.   MR Head w/o Contrast Angiogram    Narrative     MR ANGIOGRAM OF THE HEAD WITHOUT CONTRAST April 2, 2019 12:32 PM     HISTORY: Dizziness, nausea, double vision, evaluate for stroke.    TECHNIQUE:  3D time-of-flight MR angiogram of the head without  contrast. MIP reconstruction of all MR angiographic data was  performed.    COMPARISON: None.    FINDINGS: The bilateral distal internal carotid, basilar, bilateral  anterior cerebral, bilateral middle cerebral and bilateral posterior  cerebral arteries are patent and unremarkable with no evidence for  cerebral artery stenosis or aneurysm.       Impression    IMPRESSION: Normal MR angiogram of the head.       MR Neck w/o & w Contrast Angiogram    Narrative    MRA NECK WITHOUT AND WITH CONTRAST  4/2/2019 12:50 PM     COMPARISON: None    HISTORY: Dizziness, nausea, double vision. Evaluate for stroke.    TECHNIQUE: 2D time-of-flight MR angiogram of the neck without contrast  and 3D MR angiogram of the neck with 8 mL IV Gadavist gadolinium IV  contrast. MIP reconstruction of all MR angiographic data was  performed. Estimates of carotid stenoses are made relative to the  distal internal carotid artery diameters except as noted.    FINDINGS:    Carotids: The common carotid arteries bilaterally are widely patent.  The cervical internal carotid arteries bilaterally are patent without  stenosis.    Vertebrals: The vertebral arteries bilaterally are patent without  stenosis and demonstrate antegrade flow.    Aortic arch: The arteries as they arise from the aortic arch are  normally arranged with no evidence for stenosis.      Impression    IMPRESSION: Normal MR angiogram of the neck.   CBC with platelets differential   Result Value Ref Range    WBC 4.2 4.0 - 11.0 10e9/L    RBC Count 4.76 4.4 - 5.9 10e12/L    Hemoglobin 14.1 13.3 - 17.7 g/dL    Hematocrit 42.5 40.0 - 53.0 %    MCV 89 78 - 100 fl    MCH 29.6 26.5 - 33.0 pg    MCHC 33.2 31.5 - 36.5 g/dL    RDW 13.9 10.0 - 15.0 %    Platelet Count 278 150 - 450 10e9/L    Diff  Method Automated Method     % Neutrophils 41.6 %    % Lymphocytes 44.2 %    % Monocytes 8.7 %    % Eosinophils 4.3 %    % Basophils 1.0 %    % Immature Granulocytes 0.2 %    Nucleated RBCs 0 0 /100    Absolute Neutrophil 1.7 1.6 - 8.3 10e9/L    Absolute Lymphocytes 1.8 0.8 - 5.3 10e9/L    Absolute Monocytes 0.4 0.0 - 1.3 10e9/L    Absolute Eosinophils 0.2 0.0 - 0.7 10e9/L    Absolute Basophils 0.0 0.0 - 0.2 10e9/L    Abs Immature Granulocytes 0.0 0 - 0.4 10e9/L    Absolute Nucleated RBC 0.0    Basic metabolic panel   Result Value Ref Range    Sodium 143 133 - 144 mmol/L    Potassium 4.2 3.4 - 5.3 mmol/L    Chloride 107 94 - 109 mmol/L    Carbon Dioxide 30 20 - 32 mmol/L    Anion Gap 6 3 - 14 mmol/L    Glucose 101 (H) 70 - 99 mg/dL    Urea Nitrogen 14 7 - 30 mg/dL    Creatinine 1.19 0.66 - 1.25 mg/dL    GFR Estimate 70 >60 mL/min/[1.73_m2]    GFR Estimate If Black 81 >60 mL/min/[1.73_m2]    Calcium 8.6 8.5 - 10.1 mg/dL            Assessments & Plan (with Medical Decision Making)   This patient presented to the Emergency Department with elevated blood pressure and complaints of vertigo.  He has a nonfocal neurologic exam.  Twelve-lead EKG demonstrated some LVH but no ischemic changes.  As symptoms seem more vertiginous in nature and blood pressure is elevated and he complains of intermittent headaches I did feel the cerebral imaging was warranted to evaluate for posterior circulation strokes.  MRI of the brain and MRA of brain and neck were obtained and demonstrated no evidence of stroke, bleed, mass or other acute finding.  I suspect that symptoms may be related to hypertension or more peripheral in nature.  Patient has an appointment to follow-up with his clinic in the morning a for further discussion of blood pressure control.  No signs of acute endorgan damage from his high blood pressure so no need to emergently lower his pressure at this point in time.  This is discussed with the patient and he was discharged in  good condition with instructions for care and follow-up.    This part of the medical record was transcribed by Brendan Ramos, Medical Scribe, from a dictation done by James Roe MD.     I have reviewed the nursing notes.    I have reviewed the findings, diagnosis, plan and need for follow up with the patient.       Medication List      There are no discharge medications for this visit.         Final diagnoses:   Vertigo   Hypertension, unspecified type     I, Brendan Ramos, am serving as a trained medical scribe to document services personally performed by James Roe MD, based on the provider's statements to me.   I, James Roe MD, was physically present and have reviewed and verified the accuracy of this note documented by Brendan Ramos.    4/2/2019   [facility], [place], EMERGENCY DEPARTMENT     James Roe MD  04/02/19 [id number]

## 2019-04-03 ENCOUNTER — OFFICE VISIT (OUTPATIENT)
Dept: FAMILY MEDICINE | Facility: CLINIC | Age: 54
End: 2019-04-03
Payer: COMMERCIAL

## 2019-04-03 VITALS
BODY MASS INDEX: 27.96 KG/M2 | HEART RATE: 104 BPM | WEIGHT: 178.5 LBS | RESPIRATION RATE: 14 BRPM | DIASTOLIC BLOOD PRESSURE: 105 MMHG | SYSTOLIC BLOOD PRESSURE: 159 MMHG | OXYGEN SATURATION: 99 % | TEMPERATURE: 98.7 F

## 2019-04-03 DIAGNOSIS — I10 ESSENTIAL HYPERTENSION WITH GOAL BLOOD PRESSURE LESS THAN 140/90: ICD-10-CM

## 2019-04-03 LAB — INTERPRETATION ECG - MUSE: NORMAL

## 2019-04-03 PROCEDURE — 99214 OFFICE O/P EST MOD 30 MIN: CPT | Performed by: NURSE PRACTITIONER

## 2019-04-03 RX ORDER — AMLODIPINE BESYLATE 5 MG/1
10 TABLET ORAL DAILY
Qty: 90 TABLET | Refills: 3 | Status: SHIPPED | OUTPATIENT
Start: 2019-04-03

## 2019-04-03 NOTE — PATIENT INSTRUCTIONS
Patient Education     Eating Heart-Healthy Foods  Eating has a big impact on your heart health. In fact, eating healthier can improve several of your heart risks at once. For instance, it helps you manage weight, cholesterol, and blood pressure. Here are ideas to help you make heart-healthy changes without giving up all the foods and flavors you love.  Getting started    Talk with your healthcare provider about eating plans, such as the DASH or Mediterranean diet. You may also be referred to a dietitian.    Change a few things at a time. Give yourself time to get used to a few eating changes before adding more.    Work to create a tasty, healthy eating plan that you can stick to for the rest of your life.    Goals for healthy eating  Below are some tips to improve your eating habits:    Limit saturated fats and trans fats. Saturated fats raise your levels of cholesterol, so keep these fats to a minimum. They are found in foods such as fatty meats, whole milk, cheese, and palm and coconut oils. Avoid trans fats because they lower good cholesterol as well as raise bad cholesterol. Trans fats are most often found in processed foods.    Reduce sodium (salt) intake. Eating too much salt may increase your blood pressure. Limit your sodium intake to 2,300 milligrams (mg) per day (the amount in 1 teaspoon of salt), or less if your healthcare provider recommends it. Dining out less often and eating fewer processed foods are two great ways to decrease the amount of salt you consume.    Managing calories. A calorie is a unit of energy. Your body burns calories for fuel, but if you eat more calories than your body burns, the extras are stored as fat. Your healthcare provider can help you create a diet plan to manage your calories. This will likely include eating healthier foods as well as exercising regularly. To help you track your progress, keep a diary to record what you eat and how often you exercise.  Choose the right  foods  Aim to make these foods staples of your diet. If you have diabetes, you may have different recommendations than what is listed here:    Fruits and vegetables provide plenty of nutrients without a lot of calories. At meals, fill half your plate with these foods. Split the other half of your plate between whole grains and lean protein.    Whole grains are high in fiber and rich in vitamins and nutrients. Good choices include whole-wheat bread, pasta, and brown rice.    Lean proteins give you nutrition with less fat. Good choices include fish, skinless chicken, and beans.    Low-fat or nonfat dairy provides nutrients without a lot of fat. Try low-fat or nonfat milk, cheese, or yogurt.    Healthy fats can be good for you in small amounts. These are unsaturated fats, such as olive oil, nuts, and fish. Try to have at least 2 servings per week of fatty fish, such as salmon, sardines, mackerel, rainbow trout, and albacore tuna. These contain omega-3 fatty acids, which are good for your heart. Flaxseed is another source of a heart-healthy fat.  More on heart-healthy eating  Read food labels  Healthy eating starts at the grocery store. Be sure to pay attention to food labels on packaged foods. Look for products that are high in fiber and protein, and low in saturated fat, cholesterol, and sodium. Avoid products that contain trans fat. And pay close attention to serving size. For instance, if you plan to eat two servings, double all the numbers on the label.  Prepare food right  A key part of healthy cooking is cutting down on added fat and salt. Look on the internet for lower-fat, lower-sodium recipes. Also, try these tips:    Remove fat from meat and skin from poultry before cooking.    Skim fat from the surface of soups and sauces.    Broil, boil, bake, steam, grill, and microwave food without added fats.    Choose ingredients that spice up your food without adding calories, fat, or sodium. Try these items:  horseradish, hot sauce, lemon, mustard, nonfat salad dressings, and vinegar. For salt-free herbs and spices, try basil, cilantro, cinnamon, pepper, and rosemary.  Date Last Reviewed: 10/1/2017    8367-7873 [employer]. [address] [place] [address]. All rights reserved. This information is not intended as a substitute for professional medical care. Always follow your healthcare professional's instructions.

## 2020-03-17 ENCOUNTER — TELEPHONE (OUTPATIENT)
Dept: FAMILY MEDICINE | Facility: CLINIC | Age: 55
End: 2020-03-17

## 2020-03-17 ENCOUNTER — VIRTUAL VISIT (OUTPATIENT)
Dept: FAMILY MEDICINE | Facility: CLINIC | Age: 55
End: 2020-03-17

## 2020-03-17 DIAGNOSIS — I10 HYPERTENSION GOAL BP (BLOOD PRESSURE) < 140/90: ICD-10-CM

## 2020-03-17 DIAGNOSIS — M10.9 ACUTE GOUTY ARTHRITIS: Primary | ICD-10-CM

## 2020-03-17 DIAGNOSIS — I10 ESSENTIAL HYPERTENSION WITH GOAL BLOOD PRESSURE LESS THAN 140/90: ICD-10-CM

## 2020-03-17 PROCEDURE — 99213 OFFICE O/P EST LOW 20 MIN: CPT | Mod: TEL | Performed by: FAMILY MEDICINE

## 2020-03-17 RX ORDER — AMLODIPINE BESYLATE 10 MG/1
10 TABLET ORAL DAILY
Qty: 90 TABLET | Refills: 3 | Status: SHIPPED | OUTPATIENT
Start: 2020-03-17

## 2020-03-17 RX ORDER — CELECOXIB 200 MG/1
200 CAPSULE ORAL DAILY
Qty: 30 CAPSULE | Refills: 1 | Status: SHIPPED | OUTPATIENT
Start: 2020-03-17

## 2020-03-17 RX ORDER — AMLODIPINE BESYLATE 10 MG/1
10 TABLET ORAL DAILY
Qty: 90 TABLET | Refills: 3 | Status: SHIPPED | OUTPATIENT
Start: 2020-03-17 | End: 2021-07-08

## 2020-03-17 NOTE — TELEPHONE ENCOUNTER
celecoxib (CELEBREX) 200 MG capsule [02140] (Order [id number])   amLODIPine (NORVASC) 10 MG tablet [08520] (Order [id number])   Trying to clarify which prescription to fill

## 2020-03-17 NOTE — TELEPHONE ENCOUNTER
Spoke with pharmacy and ok to cancel one of each Rx that are duplicated.    Cyndie Pisano RN   [facility]

## 2020-03-17 NOTE — PROGRESS NOTES
"Baron HUSSEIN Kenney is a 54 year old male who is being evaluated via a billable telephone visit.      The patient has been notified of following:     \"This telephone visit will be conducted via a call between you and your physician/provider. We have found that certain health care needs can be provided without the need for a physical exam.  This service lets us provide the care you need with a short phone conversation.  If a prescription is necessary we can send it directly to your pharmacy.  If lab work is needed we can place an order for that and you can then stop by our lab to have the test done at a later time.    If during the course of the call the physician/provider feels a telephone visit is not appropriate, you will not be charged for this service.\"     Consent to this visit ? Yes      Baron HUSSEIN Kenney complains of    Chief Complaint   Patient presents with     Shoulder Pain     Requesting Physical       I have reviewed and updated the patient's Past Medical History, Social History, Family History and Medication List.    ALLERGIES  Patient has no known allergies.    Sandra Ansari MA   (MA signature)     This is a duplictae , see note  "

## 2020-03-17 NOTE — TELEPHONE ENCOUNTER
"Baron HUSSEIN Kenney is a 54 year old male who is being evaluated via a billable telephone visit.      The patient has been notified of following:     \"This telephone visit will be conducted via a call between you and your physician/provider. We have found that certain health care needs can be provided without the need for a physical exam.  This service lets us provide the care you need with a short phone conversation.  If a prescription is necessary we can send it directly to your pharmacy.  If lab work is needed we can place an order for that and you can then stop by our lab to have the test done at a later time.    If during the course of the call the physician/provider feels a telephone visit is not appropriate, you will not be charged for this service.\"       Baron HUSSEIN Kenney complains of  No chief complaint on file.      I have reviewed and updated the patient's Past Medical History, Social History, Family History and Medication List.    ALLERGIES  Patient has no known allergies.        Additional provider notes: Gout  Duration of complaint: 6 weeks, worse at night  Hypertension Follow-up      Do you check your blood pressure regularly outside of the clinic? Yes     Are you following a low salt diet? Yes    Are your blood pressures ever more than 140 on the top number (systolic) OR more   than 90 on the bottom number (diastolic), for example 140/90? Yes      Assessment/Plan:  (M10.9) Acute gouty arthritis  (primary encounter diagnosis)  Comment: try  Plan: celecoxib (CELEBREX) 200 MG capsule        Watch food intake    (I10) Essential hypertension with goal blood pressure less than 140/90  Comment: not Well controlled off med  Plan: amLODIPine (NORVASC) 10 MG tablet        resume    (I10) Hypertension goal BP (blood pressure) < 140/90  Comment: as above  Plan:     I have reviewed the note as documented above.  This accurately captures the substance of my conversation with the patient.      Phone call contact " time  Call Started at 850  Call Ended at 910   Cj Villanueva MD

## 2021-07-06 DIAGNOSIS — R11.2 NAUSEA AND VOMITING, INTRACTABILITY OF VOMITING NOT SPECIFIED, UNSPECIFIED VOMITING TYPE: Primary | ICD-10-CM

## 2021-07-06 DIAGNOSIS — I10 ESSENTIAL HYPERTENSION WITH GOAL BLOOD PRESSURE LESS THAN 140/90: ICD-10-CM

## 2021-07-08 RX ORDER — MECLIZINE HYDROCHLORIDE 25 MG/1
TABLET ORAL
Qty: 15 TABLET | Refills: 0 | Status: SHIPPED | OUTPATIENT
Start: 2021-07-08

## 2021-07-08 RX ORDER — AMLODIPINE BESYLATE 10 MG/1
TABLET ORAL
Qty: 30 TABLET | Refills: 0 | Status: SHIPPED | OUTPATIENT
Start: 2021-07-08

## 2021-07-08 RX ORDER — ONDANSETRON 4 MG/1
TABLET, ORALLY DISINTEGRATING ORAL
Qty: 15 TABLET | Refills: 0 | Status: SHIPPED | OUTPATIENT
Start: 2021-07-08

## 2021-07-08 NOTE — TELEPHONE ENCOUNTER
Amlodipine:  Prescription approved per [facility] Refill Protocol.  1 month refill only; patient due for appointment (physical)    Zofran:  Routing refill request to provider for review/approval because:  Drug not active on patient's medication list    Meclizine:  Routing refill request to provider for review/approval because:  Drug not active on patient's medication list    Faith DELGADO RN

## 2021-10-16 NOTE — PROGRESS NOTES
is a 55 year old who is being evaluated via a billable telephone visit.      What phone number would you like to be contacted at? cell  How would you like to obtain your AVS? MyChart    Assessment & Plan     Post-COVID chronic loss of smell  Try flonase   consider ENT and OT referral    Essential hypertension with goal blood pressure less than 140/90  Not Well controlled   Follow up in 1 month.                Regular exercise  See Patient Instructions    No follow-ups on file.    Cj Villanueva MD  [facility]    Haydee Lopez is a 55 year old who presents for the following health issues     HPI     Concern - Unable to smell  Onset: about a year  Description: Has still been unable to smell since being diagnosed with COVID a year ago.   Intensity: severe  Progression of Symptoms:  constant  Accompanying Signs & Symptoms: otehrwise fine  Previous history of similar problem: no    Hypertension Follow-up      Do you check your blood pressure regularly outside of the clinic? No     Are you following a low salt diet? No    Are your blood pressures ever more than 140 on the top number (systolic) OR more   than 90 on the bottom number (diastolic), for example 140/90? Yes             Review of Systems   Constitutional, HEENT, cardiovascular, pulmonary, gi and gu systems are negative, except as otherwise noted.      Objective           Vitals:  No vitals were obtained today due to virtual visit.    Physical Exam   healthy, alert and no distress  PSYCH: Alert and oriented times 3; coherent speech, normal   rate and volume, able to articulate logical thoughts, able   to abstract reason, no tangential thoughts, no hallucinations   or delusions  His affect is normal  RESP: No cough, no audible wheezing, able to talk in full sentences  Remainder of exam unable to be completed due to telephone visits    Admission on 04/02/2019, Discharged on 04/02/2019   Component Date Value Ref Range  Status     Interpretation ECG 04/02/2019 Click View Image link to view waveform and result   Final     WBC 04/02/2019 4.2  4.0 - 11.0 10e9/L Final     RBC Count 04/02/2019 4.76  4.4 - 5.9 10e12/L Final     Hemoglobin 04/02/2019 14.1  13.3 - 17.7 g/dL Final     Hematocrit 04/02/2019 42.5  40.0 - 53.0 % Final     MCV 04/02/2019 89  78 - 100 fl Final     MCH 04/02/2019 29.6  26.5 - 33.0 pg Final     MCHC 04/02/2019 33.2  31.5 - 36.5 g/dL Final     RDW 04/02/2019 13.9  10.0 - 15.0 % Final     Platelet Count 04/02/2019 278  150 - 450 10e9/L Final     Diff Method 04/02/2019 Automated Method   Final     % Neutrophils 04/02/2019 41.6  % Final     % Lymphocytes 04/02/2019 44.2  % Final     % Monocytes 04/02/2019 8.7  % Final     % Eosinophils 04/02/2019 4.3  % Final     % Basophils 04/02/2019 1.0  % Final     % Immature Granulocytes 04/02/2019 0.2  % Final     Nucleated RBCs 04/02/2019 0  0 /100 Final     Absolute Neutrophil 04/02/2019 1.7  1.6 - 8.3 10e9/L Final     Absolute Lymphocytes 04/02/2019 1.8  0.8 - 5.3 10e9/L Final     Absolute Monocytes 04/02/2019 0.4  0.0 - 1.3 10e9/L Final     Absolute Eosinophils 04/02/2019 0.2  0.0 - 0.7 10e9/L Final     Absolute Basophils 04/02/2019 0.0  0.0 - 0.2 10e9/L Final     Abs Immature Granulocytes 04/02/2019 0.0  0 - 0.4 10e9/L Final     Absolute Nucleated RBC 04/02/2019 0.0   Final     Sodium 04/02/2019 143  133 - 144 mmol/L Final     Potassium 04/02/2019 4.2  3.4 - 5.3 mmol/L Final     Chloride 04/02/2019 107  94 - 109 mmol/L Final     Carbon Dioxide 04/02/2019 30  20 - 32 mmol/L Final     Anion Gap 04/02/2019 6  3 - 14 mmol/L Final     Glucose 04/02/2019 101* 70 - 99 mg/dL Final     Urea Nitrogen 04/02/2019 14  7 - 30 mg/dL Final     Creatinine 04/02/2019 1.19  0.66 - 1.25 mg/dL Final     GFR Estimate 04/02/2019 70  >60 mL/min/[1.73_m2] Final    Comment: Non  GFR Calc  Starting 12/18/2018, serum creatinine based estimated GFR (eGFR) will be   calculated using the  Chronic Kidney Disease Epidemiology Collaboration   (CKD-EPI) equation.       GFR Estimate If Black 04/02/2019 81  >60 mL/min/[1.73_m2] Final    Comment:  GFR Calc  Starting 12/18/2018, serum creatinine based estimated GFR (eGFR) will be   calculated using the Chronic Kidney Disease Epidemiology Collaboration   (CKD-EPI) equation.       Calcium 04/02/2019 8.6  8.5 - 10.1 mg/dL Final               Phone call duration: 15 minutes

## 2021-10-18 ENCOUNTER — VIRTUAL VISIT (OUTPATIENT)
Dept: FAMILY MEDICINE | Facility: CLINIC | Age: 56
End: 2021-10-18
Payer: COMMERCIAL

## 2021-10-18 DIAGNOSIS — I10 ESSENTIAL HYPERTENSION WITH GOAL BLOOD PRESSURE LESS THAN 140/90: ICD-10-CM

## 2021-10-18 DIAGNOSIS — U09.9 POST-COVID CHRONIC LOSS OF SMELL: Primary | ICD-10-CM

## 2021-10-18 DIAGNOSIS — R43.0 POST-COVID CHRONIC LOSS OF SMELL: Primary | ICD-10-CM

## 2021-10-18 PROCEDURE — 99213 OFFICE O/P EST LOW 20 MIN: CPT | Performed by: FAMILY MEDICINE

## 2022-02-17 PROBLEM — K21.9 GASTROESOPHAGEAL REFLUX DISEASE: Status: ACTIVE | Noted: 2017-04-21

## 2025-02-20 NOTE — PROGRESS NOTES
Denied refill of dospak - he needs a follow up appt with Ms Shah   There already is an active order for repeat TSH lab. Lab should be completed 4-6 weeks form previous lab- due around 3/7/25.